# Patient Record
Sex: MALE | Race: WHITE | Employment: FULL TIME | URBAN - METROPOLITAN AREA
[De-identification: names, ages, dates, MRNs, and addresses within clinical notes are randomized per-mention and may not be internally consistent; named-entity substitution may affect disease eponyms.]

---

## 2020-12-06 ENCOUNTER — APPOINTMENT (OUTPATIENT)
Dept: GENERAL RADIOLOGY | Facility: CLINIC | Age: 40
End: 2020-12-06
Payer: MEDICARE

## 2020-12-06 ENCOUNTER — HOSPITAL ENCOUNTER (EMERGENCY)
Facility: CLINIC | Age: 40
Discharge: HOME | End: 2020-12-06
Attending: EMERGENCY MEDICINE
Payer: MEDICARE

## 2020-12-06 VITALS
TEMPERATURE: 98.4 F | OXYGEN SATURATION: 98 % | WEIGHT: 223 LBS | DIASTOLIC BLOOD PRESSURE: 96 MMHG | BODY MASS INDEX: 31.22 KG/M2 | HEIGHT: 71 IN | RESPIRATION RATE: 18 BRPM | SYSTOLIC BLOOD PRESSURE: 136 MMHG | HEART RATE: 68 BPM

## 2020-12-06 PROCEDURE — 73110 X-RAY EXAM OF WRIST: CPT

## 2020-12-06 PROCEDURE — 99283 EMERGENCY DEPT VISIT LOW MDM: CPT

## 2020-12-06 RX ORDER — KETOROLAC TROMETHAMINE 30 MG/ML
30 INJECTION, SOLUTION INTRAMUSCULAR; INTRAVENOUS ONCE
Status: DISCONTINUED | OUTPATIENT
Start: 2020-12-06 | End: 2020-12-06 | Stop reason: HOSPADM

## 2020-12-06 RX ORDER — IBUPROFEN 800 MG/1
800 TABLET ORAL EVERY 6 HOURS PRN
COMMUNITY

## 2020-12-06 RX ORDER — PREDNISONE 10 MG/1
10 TABLET ORAL SEE ADMIN INSTRUCTIONS
Qty: 21 TABLET | Refills: 0 | Status: SHIPPED | OUTPATIENT
Start: 2020-12-06 | End: 2020-12-13

## 2020-12-06 ASSESSMENT — PAIN SCALES - GENERAL
PAINLEVEL_OUTOF10: 3
PAINLEVEL_OUTOF10: 3

## 2020-12-06 NOTE — ED PROVIDER NOTES
[address] [place] [address] ED  15 [facility]  Phone: [facility] ED  EMERGENCY DEPARTMENT ENCOUNTER      Pt Name: Shade Mata  MRN: [id number]  [place] 1980  Date of evaluation: 12/6/2020  Provider: Maureen Romero DO    CHIEF COMPLAINT       Chief Complaint   Patient presents with    Wrist Pain     4 weeks.,          HISTORY OF PRESENT ILLNESS   (Location/Symptom, Timing/Onset,Context/Setting, Quality, Duration, Modifying Factors, Severity)  Note limiting factors. Shade Mata is a 41 y.o. male who presents to the emergency department for the evaluation of wrist pain. This is been going on approximately 4 weeks but it has gotten worse over the past week and a half. 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 He does not have numbness or weakness in the left hand. He takes 800 mg of ibuprofen every day and it does help with the pain comes back each day. It is a sharp pain. Nursing Notes were reviewed. REVIEW OF SYSTEMS    (2-9systems for level 4, 10 or more for level 5)     Review of Systems   Musculoskeletal:        Left wrist pain   Skin: Negative for wound. Neurological: Negative for weakness and numbness. Except asnoted above the remainder of the review of systems was reviewed and negative. PAST MEDICAL HISTORY     Past Medical History:   Diagnosis Date    Hyperlipemia          SURGICAL HISTORY       Past Surgical History:   Procedure Laterality Date    FRACTURE SURGERY           CURRENT MEDICATIONS     Previous Medications    ACYCLOVIR PO    Take by mouth    IBUPROFEN (ADVIL;MOTRIN) 800 MG TABLET    Take 800 mg by mouth every 6 hours as needed for Pain       ALLERGIES     Shellfish-derived products    FAMILY HISTORY     History reviewed. No pertinent family history.        SOCIAL HISTORY       Social History     Socioeconomic History    Marital status:      Spouse name: None    Number of children: None    Years of education: None    Highest education level: None   Occupational History    None   Social Needs    Financial resource strain: None    Food insecurity     Worry: None     Inability: None    Transportation needs     Medical: None     Non-medical: None   Tobacco Use    Smoking status: Never Smoker    Smokeless tobacco: Never Used   Substance and Sexual Activity    Alcohol use: Yes     Comment: rarely     Drug use: None    Sexual activity: None   Lifestyle    Physical activity     Days per week: None     Minutes per session: None    Stress: None   Relationships    Social connections     Talks on phone: None     Gets together: None     Attends Anabaptist service: None     Active member of club or organization: None     Attends meetings of clubs or organizations: None     Relationship status: None    Intimate partner violence     Fear of current or ex partner: None     Emotionally abused: None     Physically abused: None     Forced sexual activity: None   Other Topics Concern    None   Social History Narrative    None       SCREENINGS             PHYSICAL EXAM    (up to 7 for level 4, 8 or more for level 5)     ED Triage Vitals   BP Temp Temp src Pulse Resp SpO2 Height Weight   -- -- -- -- -- -- -- --       Physical Exam  Vitals signs and nursing note reviewed. Constitutional:       General: He is not in acute distress. Appearance: Normal appearance. He is not ill-appearing or toxic-appearing. HENT:      Head: Normocephalic and atraumatic. Eyes:      General:         Right eye: No discharge. Left eye: No discharge. Conjunctiva/sclera: Conjunctivae normal.   Neck:      Musculoskeletal: Normal range of motion. Cardiovascular:      Rate and Rhythm: Normal rate and regular rhythm. Pulses: Normal pulses. Pulmonary:      Effort: Pulmonary effort is normal. No respiratory distress. Abdominal:      General: There is no distension.    Musculoskeletal: with outpatient follow-up. Standard anticipatory guidance given to patient upon discharge. Have given them a specific time frame in which to follow-up and who to follow-up with. I have also advised them that they should return to the emergency department if they get worse, or not getting better or develop any new or concerning symptoms. Patient demonstrates understanding.    [TS]      ED Course User Index  [TS] Bar Garcia DO         PROCEDURES:  Unless otherwise noted below, none     Procedures    FINAL IMPRESSION      1. Left wrist tendinitis          DISPOSITION/PLAN   DISPOSITION Decision To Discharge 12/06/2020 05:37:57 PM      PATIENT REFERRED TO:  Costa Hsu  [address] [place] [address].   [facility] [id number]  [phone]    In 1 week        DISCHARGE MEDICATIONS:  New Prescriptions    PREDNISONE (DELTASONE) 10 MG TABLET    Take 1 tablet by mouth See Admin Instructions for 7 days Take 4 tablets by mouth daily for days 1-3   Take 3 tablets by mouth daily for days 4-5   Take 2 tablets by mouth daily day 6   Take 1 tablet by mouth on day 7          (Please note that portions of this note were completed with a voice recognition program.  Efforts were made to edit the dictations but occasionally words are mis-transcribed.)    Bar Garcia DO (electronically signed)  Board Certified Emergency Physician         Bar Garcia DO  12/06/20 1001

## 2020-12-06 NOTE — ED TRIAGE NOTES
Pt presents to ER with left wrist pain 2-4 weeks. 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

## 2021-07-09 ENCOUNTER — HOSPITAL ENCOUNTER (EMERGENCY)
Facility: CLINIC | Age: 41
Discharge: HOME | End: 2021-07-09
Attending: EMERGENCY MEDICINE
Payer: MEDICARE

## 2021-07-09 VITALS
DIASTOLIC BLOOD PRESSURE: 100 MMHG | WEIGHT: 235 LBS | RESPIRATION RATE: 10 BRPM | OXYGEN SATURATION: 96 % | HEIGHT: 71 IN | SYSTOLIC BLOOD PRESSURE: 146 MMHG | TEMPERATURE: 98.4 F | HEART RATE: 77 BPM | BODY MASS INDEX: 32.9 KG/M2

## 2021-07-09 DIAGNOSIS — R42 DIZZINESS: Primary | ICD-10-CM

## 2021-07-09 LAB
ABSOLUTE EOS #: 0.2 K/UL (ref 0–0.4)
ABSOLUTE IMMATURE GRANULOCYTE: NORMAL K/UL (ref 0–0.3)
ABSOLUTE LYMPH #: 2.7 K/UL (ref 1–4.8)
ABSOLUTE MONO #: 0.6 K/UL (ref 0.1–1.2)
ANION GAP SERPL CALCULATED.3IONS-SCNC: 13 MMOL/L (ref 9–17)
BASOPHILS # BLD: 1 % (ref 0–2)
BASOPHILS ABSOLUTE: 0.1 K/UL (ref 0–0.2)
BUN BLDV-MCNC: 12 MG/DL (ref 6–20)
BUN/CREAT BLD: NORMAL (ref 9–20)
CALCIUM SERPL-MCNC: 9.9 MG/DL (ref 8.6–10.4)
CHLORIDE BLD-SCNC: 107 MMOL/L (ref 98–107)
CO2: 21 MMOL/L (ref 20–31)
CREAT SERPL-MCNC: 0.9 MG/DL (ref 0.7–1.2)
DIFFERENTIAL TYPE: NORMAL
EOSINOPHILS RELATIVE PERCENT: 3 % (ref 1–4)
GFR AFRICAN AMERICAN: >60 ML/MIN
GFR NON-AFRICAN AMERICAN: >60 ML/MIN
GFR SERPL CREATININE-BSD FRML MDRD: NORMAL ML/MIN/{1.73_M2}
GFR SERPL CREATININE-BSD FRML MDRD: NORMAL ML/MIN/{1.73_M2}
GLUCOSE BLD-MCNC: 94 MG/DL (ref 70–99)
HCT VFR BLD CALC: 45.3 % (ref 41–53)
HEMOGLOBIN: 15.2 G/DL (ref 13.5–17.5)
IMMATURE GRANULOCYTES: NORMAL %
LYMPHOCYTES # BLD: 36 % (ref 24–44)
MCH RBC QN AUTO: 30.1 PG (ref 26–34)
MCHC RBC AUTO-ENTMCNC: 33.5 G/DL (ref 31–37)
MCV RBC AUTO: 89.9 FL (ref 80–100)
MONOCYTES # BLD: 8 % (ref 2–11)
NRBC AUTOMATED: NORMAL PER 100 WBC
PDW BLD-RTO: 13.2 % (ref 12.5–15.4)
PLATELET # BLD: 203 K/UL (ref 140–450)
PLATELET ESTIMATE: NORMAL
PMV BLD AUTO: 8.4 FL (ref 6–12)
POTASSIUM SERPL-SCNC: 4.2 MMOL/L (ref 3.7–5.3)
RBC # BLD: 5.04 M/UL (ref 4.5–5.9)
RBC # BLD: NORMAL 10*6/UL
SEG NEUTROPHILS: 52 % (ref 36–66)
SEGMENTED NEUTROPHILS ABSOLUTE COUNT: 3.9 K/UL (ref 1.8–7.7)
SODIUM BLD-SCNC: 141 MMOL/L (ref 135–144)
WBC # BLD: 7.5 K/UL (ref 3.5–11)
WBC # BLD: NORMAL 10*3/UL

## 2021-07-09 PROCEDURE — 6360000002 HC RX W HCPCS: Performed by: EMERGENCY MEDICINE

## 2021-07-09 PROCEDURE — 96375 TX/PRO/DX INJ NEW DRUG ADDON: CPT

## 2021-07-09 PROCEDURE — 96374 THER/PROPH/DIAG INJ IV PUSH: CPT

## 2021-07-09 PROCEDURE — 36415 COLL VENOUS BLD VENIPUNCTURE: CPT

## 2021-07-09 PROCEDURE — 93005 ELECTROCARDIOGRAM TRACING: CPT | Performed by: EMERGENCY MEDICINE

## 2021-07-09 PROCEDURE — 85025 COMPLETE CBC W/AUTO DIFF WBC: CPT

## 2021-07-09 PROCEDURE — 80048 BASIC METABOLIC PNL TOTAL CA: CPT

## 2021-07-09 PROCEDURE — 99281 EMR DPT VST MAYX REQ PHY/QHP: CPT

## 2021-07-09 PROCEDURE — 2580000003 HC RX 258: Performed by: EMERGENCY MEDICINE

## 2021-07-09 PROCEDURE — 96361 HYDRATE IV INFUSION ADD-ON: CPT

## 2021-07-09 RX ORDER — KETOROLAC TROMETHAMINE 30 MG/ML
30 INJECTION, SOLUTION INTRAMUSCULAR; INTRAVENOUS ONCE
Status: COMPLETED | OUTPATIENT
Start: 2021-07-09 | End: 2021-07-09

## 2021-07-09 RX ORDER — SODIUM CHLORIDE, SODIUM LACTATE, POTASSIUM CHLORIDE, AND CALCIUM CHLORIDE .6; .31; .03; .02 G/100ML; G/100ML; G/100ML; G/100ML
1000 INJECTION, SOLUTION INTRAVENOUS ONCE
Status: COMPLETED | OUTPATIENT
Start: 2021-07-09 | End: 2021-07-09

## 2021-07-09 RX ORDER — ONDANSETRON 2 MG/ML
4 INJECTION INTRAMUSCULAR; INTRAVENOUS ONCE
Status: COMPLETED | OUTPATIENT
Start: 2021-07-09 | End: 2021-07-09

## 2021-07-09 RX ADMIN — SODIUM CHLORIDE, POTASSIUM CHLORIDE, SODIUM LACTATE AND CALCIUM CHLORIDE 1000 ML: 600; 310; 30; 20 INJECTION, SOLUTION INTRAVENOUS at 13:22

## 2021-07-09 RX ADMIN — KETOROLAC TROMETHAMINE 30 MG: 30 INJECTION, SOLUTION INTRAMUSCULAR; INTRAVENOUS at 13:22

## 2021-07-09 RX ADMIN — ONDANSETRON 4 MG: 2 INJECTION INTRAMUSCULAR; INTRAVENOUS at 13:22

## 2021-07-09 ASSESSMENT — ENCOUNTER SYMPTOMS
ABDOMINAL PAIN: 0
BACK PAIN: 0
SORE THROAT: 0
NAUSEA: 1
DIARRHEA: 0
SHORTNESS OF BREATH: 0
COUGH: 0
VOMITING: 0

## 2021-07-09 ASSESSMENT — PAIN SCALES - GENERAL: PAINLEVEL_OUTOF10: 8

## 2021-07-09 ASSESSMENT — PAIN DESCRIPTION - LOCATION: LOCATION: HEAD

## 2021-07-09 NOTE — ED PROVIDER NOTES
[facility]  15 [facility]  Phone: [phone]        Pt Name: Christy Pennington  MRN: [id number]  [place] 1980  Date of evaluation: 7/9/21      CHIEF COMPLAINT     Chief Complaint   Patient presents with    Dizziness    Fatigue    Headache         HISTORY OF PRESENT ILLNESS  (Location/Symptom, Timing/Onset, Context/Setting, Quality, Duration, Modifying Factors, Severity.)    Christy Pennington is a 35 y.o. male who presents in the company of his wife, who has the same symptoms. Patient complains of dizziness, fatigue, and a headache. 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 He states that he has adequate PPE at the factory. No head injury. His headache is not sudden onset or worst of life. No numbness, tingling, or weakness. No fever or chills. No neck pain or stiffness. He does feel fatigued. He complains of photophobia. REVIEW OF SYSTEMS    (2-9 systems for level 4, 10 or more for level 5)     Review of Systems   Constitutional: Negative for chills and fever. HENT: Negative for congestion and sore throat. Respiratory: Negative for cough and shortness of breath. Cardiovascular: Negative for chest pain and palpitations. Gastrointestinal: Positive for nausea. Negative for abdominal pain, diarrhea and vomiting. Genitourinary: Negative for dysuria, frequency and urgency. Musculoskeletal: Negative for back pain and neck pain. Skin: Negative for rash and wound. Neurological: Positive for dizziness, light-headedness and headaches. Hematological: Negative for adenopathy. Does not bruise/bleed easily. PAST MEDICAL HISTORY    has a past medical history of Hyperlipemia. SURGICAL HISTORY      has a past surgical history that includes fracture surgery.     [address] Previous Medications    ACYCLOVIR PO    Take by mouth    IBUPROFEN (ADVIL;MOTRIN) 800 MG TABLET    Take 800 mg by mouth every 6 hours as needed for Pain       ALLERGIES     is allergic to shellfish-derived products. FAMILY HISTORY     has no family status information on file. family history is not on file. SOCIAL HISTORY      reports that he has never smoked. He has never used smokeless tobacco. He reports current alcohol use. PHYSICAL EXAM    (up to 7 for level 4, 8 or more for level 5)   INITIAL VITALS:  height is 5' 11\" (1.803 m) and weight is 106.6 kg (235 lb). His oral temperature is 98.4 °F (36.9 °C). His blood pressure is 146/100 (abnormal) and his pulse is 77. His respiration is 10 and oxygen saturation is 96%. Physical Exam  Vitals and nursing note reviewed. Constitutional:       Appearance: Normal appearance. He is obese. He is not ill-appearing. HENT:      Head: Normocephalic and atraumatic. Mouth/Throat:      Mouth: Mucous membranes are moist.      Pharynx: Oropharynx is clear. Eyes:      Extraocular Movements: Extraocular movements intact. Pupils: Pupils are equal, round, and reactive to light. Cardiovascular:      Rate and Rhythm: Normal rate and regular rhythm. Pulses: Normal pulses. Heart sounds: Normal heart sounds. No murmur heard. No friction rub. No gallop. Pulmonary:      Effort: Pulmonary effort is normal.      Breath sounds: Normal breath sounds. No wheezing, rhonchi or rales. Abdominal:      General: Abdomen is flat. Bowel sounds are normal.      Palpations: Abdomen is soft. Tenderness: There is no abdominal tenderness. There is no guarding or rebound. Musculoskeletal:         General: No tenderness. Normal range of motion. Cervical back: Normal range of motion and neck supple. No tenderness. Right lower leg: No edema. Left lower leg: No edema. Skin:     General: Skin is warm and dry.       Capillary Refill: Capillary refill takes less than 2 seconds. Neurological:      General: No focal deficit present. Mental Status: He is alert and oriented to person, place, and time. Mental status is at baseline. Cranial Nerves: No cranial nerve deficit. Sensory: No sensory deficit. Motor: No weakness. Coordination: Coordination normal.      Gait: Gait normal.   Psychiatric:         Mood and Affect: Mood normal.         Behavior: Behavior normal.         Thought Content: Thought content normal.         DIFFERENTIAL DIAGNOSIS/ MDM:     The patient presents with headache without signs of CNS bleed, stroke, infection, temporal arteritis, idiopathic intracranial hypertension, or other serious etiology. The patient is neurologically intact. Given the extremely low risk of these diagnoses further testing and evaluation for these possibilities does not appear to be indicated at this time. The patient appears stable for discharge and has been instructed to return immediately if the symptoms worsen in any way, or in 8-12 hr if not improved for re-evaluation. We also discussed returning to the Emergency Department immediately if new or worsening symptoms occur. We have discussed the symptoms which are most concerning (e.g., changing or worsening pain, visual or hearing changes, numbness or weakness, fever, stiff neck, or rash) that necessitate immediate return. The patient understands that at this time there is no evidence for a more malignant underlying process, but the patient also understands that early in the process of an illness or injury, an emergency department workup can be falsely reassuring. Routine discharge counseling was given, and the patient understands that worsening, changing or persistent symptoms should prompt an immediate call or follow up with their primary physician or return to the emergency department. The importance of appropriate follow up was also discussed.   I have reviewed the disposition diagnosis with the patient and or their family/guardian. I have answered their questions and given discharge instructions. They voiced understanding of these instructions and did not have any further questions or complaints.                 DIAGNOSTIC RESULTS     EKG: All EKG's are interpreted by the Emergency Department Physician who either signs or Co-signs this chart in the absence of a cardiologist.    EKG Interpretation    Interpreted by me    Rhythm: normal sinus   Rate: normal  Axis: normal  Ectopy: none  Conduction: normal  ST Segments: no acute change  T Waves: no acute change  Q Waves: none    Clinical Impression: no acute changes and normal EKG    RADIOLOGY:        Interpretation per the Radiologist below, if available at the time of this note:    none    LABS:  Results for orders placed or performed during the hospital encounter of 65/97/33   Basic Metabolic Panel   Result Value Ref Range    Glucose 94 70 - 99 mg/dL    BUN 12 6 - 20 mg/dL    CREATININE 0.90 0.70 - 1.20 mg/dL    Bun/Cre Ratio NOT REPORTED 9 - 20    Calcium 9.9 8.6 - 10.4 mg/dL    Sodium 141 135 - 144 mmol/L    Potassium 4.2 3.7 - 5.3 mmol/L    Chloride 107 98 - 107 mmol/L    CO2 21 20 - 31 mmol/L    Anion Gap 13 9 - 17 mmol/L    GFR Non-African American >60 >60 mL/min    GFR African American >60 >60 mL/min    GFR Comment          GFR Staging NOT REPORTED    CBC Auto Differential   Result Value Ref Range    WBC 7.5 3.5 - 11.0 k/uL    RBC 5.04 4.5 - 5.9 m/uL    Hemoglobin 15.2 13.5 - 17.5 g/dL    Hematocrit 45.3 41 - 53 %    MCV 89.9 80 - 100 fL    MCH 30.1 26 - 34 pg    MCHC 33.5 31 - 37 g/dL    RDW 13.2 12.5 - 15.4 %    Platelets 294 851 - 228 k/uL    MPV 8.4 6.0 - 12.0 fL    NRBC Automated NOT REPORTED per 100 WBC    Differential Type NOT REPORTED     Seg Neutrophils 52 36 - 66 %    Lymphocytes 36 24 - 44 %    Monocytes 8 2 - 11 %    Eosinophils % 3 1 - 4 %    Basophils 1 0 - 2 %    Immature Granulocytes NOT REPORTED 0 % Segs Absolute 3.90 1.8 - 7.7 k/uL    Absolute Lymph # 2.70 1.0 - 4.8 k/uL    Absolute Mono # 0.60 0.1 - 1.2 k/uL    Absolute Eos # 0.20 0.0 - 0.4 k/uL    Basophils Absolute 0.10 0.0 - 0.2 k/uL    Absolute Immature Granulocyte NOT REPORTED 0.00 - 0.30 k/uL    WBC Morphology NOT REPORTED     RBC Morphology NOT REPORTED     Platelet Estimate NOT REPORTED        No significant laboratory abnormalities    EMERGENCY DEPARTMENT COURSE:   Vitals:    Vitals:    07/09/21 1205   BP: (!) 146/100   Pulse: 77   Resp: 10   Temp: 98.4 °F (36.9 °C)   TempSrc: Oral   SpO2: 96%   Weight: 106.6 kg (235 lb)   Height: 5' 11\" (1.803 m)     -------------------------  BP: (!) 146/100, Temp: 98.4 °F (36.9 °C), Pulse: 77, Resp: 10      RE-EVALUATION:  2:05 PM EDT  Patient appears much more comfortable. He has received a liter of IV fluids. Updated on available results. Patient will be discharged home. Follow-up with the primary care doctor. CONSULTS:  none    PROCEDURES:  None    FINAL IMPRESSION      1. Dizziness          DISPOSITION/PLAN   DISPOSITION Decision To Discharge 07/09/2021 02:02:06 PM      CONDITION ON DISPOSITION:   Stable     PATIENT REFERRED TO:  Naomi Olivera  [address]   [place] [address]  [phone]    Schedule an appointment as soon as possible for a visit in 2 days        DISCHARGE MEDICATIONS:  New Prescriptions    No medications on file       (Please note that portions of this note were completed with a voicerecognition program.  Efforts were made to edit the dictations but occasionally words are mis-transcribed.)    Marciano Carias MD  Attending Emergency Medicine Physician       Marciano Carias MD  07/09/21 7953

## 2021-07-10 LAB
EKG ATRIAL RATE: 63 BPM
EKG P AXIS: 28 DEGREES
EKG P-R INTERVAL: 118 MS
EKG Q-T INTERVAL: 426 MS
EKG QRS DURATION: 94 MS
EKG QTC CALCULATION (BAZETT): 435 MS
EKG R AXIS: 65 DEGREES
EKG T AXIS: 52 DEGREES
EKG VENTRICULAR RATE: 63 BPM

## 2022-01-12 ENCOUNTER — HOSPITAL ENCOUNTER (EMERGENCY)
Facility: CLINIC | Age: 42
Discharge: HOME | End: 2022-01-12
Attending: EMERGENCY MEDICINE
Payer: MEDICARE

## 2022-01-12 VITALS
BODY MASS INDEX: 32.9 KG/M2 | SYSTOLIC BLOOD PRESSURE: 156 MMHG | DIASTOLIC BLOOD PRESSURE: 96 MMHG | OXYGEN SATURATION: 96 % | WEIGHT: 235 LBS | TEMPERATURE: 99.3 F | RESPIRATION RATE: 16 BRPM | HEART RATE: 101 BPM | HEIGHT: 71 IN

## 2022-01-12 DIAGNOSIS — J01.80 ACUTE NON-RECURRENT SINUSITIS OF OTHER SINUS: Primary | ICD-10-CM

## 2022-01-12 PROCEDURE — 99284 EMERGENCY DEPT VISIT MOD MDM: CPT

## 2022-01-12 RX ORDER — AZITHROMYCIN 250 MG/1
TABLET, FILM COATED ORAL
Qty: 1 PACKET | Refills: 0 | Status: SHIPPED | OUTPATIENT
Start: 2022-01-12 | End: 2022-05-30

## 2022-01-12 RX ORDER — PREDNISONE 50 MG/1
50 TABLET ORAL DAILY
Qty: 4 TABLET | Refills: 0 | Status: SHIPPED | OUTPATIENT
Start: 2022-01-12 | End: 2022-01-16

## 2022-01-12 RX ORDER — PREDNISONE 50 MG/1
50 TABLET ORAL DAILY
Qty: 4 TABLET | Refills: 0 | Status: SHIPPED | OUTPATIENT
Start: 2022-01-12 | End: 2022-01-12 | Stop reason: SDUPTHER

## 2022-01-12 RX ORDER — AZITHROMYCIN 250 MG/1
TABLET, FILM COATED ORAL
Qty: 1 PACKET | Refills: 0 | Status: SHIPPED | OUTPATIENT
Start: 2022-01-12 | End: 2022-01-12 | Stop reason: SDUPTHER

## 2022-01-12 ASSESSMENT — ENCOUNTER SYMPTOMS
DIARRHEA: 0
SORE THROAT: 0
SINUS PAIN: 1
VOMITING: 0
SHORTNESS OF BREATH: 0

## 2022-01-12 NOTE — ED PROVIDER NOTES
[facility]  [facility]  Phone: [facility] ED  EMERGENCY DEPARTMENT ENCOUNTER      Pt Name: Trent Alejandro  MRN: [id number]  [place] 1980  Date of evaluation: 1/12/2022  Provider: Mary Kay Greenwood DO    CHIEF COMPLAINT       Chief Complaint   Patient presents with    Cough    Pharyngitis    Generalized Body Aches    Sinusitis         HISTORY OF PRESENT ILLNESS   (Location/Symptom, Timing/Onset,Context/Setting, Quality, Duration, Modifying Factors, Severity)  Note limiting factors. Trent Alejandro is a 35 y.o. male who presents to the emergency department for the evaluation of cough, sinus congestion, sore throat as well as body aches. Patient states has been going on for the last few days. Thinks he had a low-grade fever at home, has not taken any Motrin or Tylenol for about 12 hours. He has no abdominal pain or vomiting or diarrhea. He does have a mild sore throat as stated but no difficulty breathing or swallowing. He has history of sinusitis and he says this feels similar    Nursing Notes were reviewed. REVIEW OF SYSTEMS    (2-9systems for level 4, 10 or more for level 5)     Review of Systems   Constitutional: Negative for fever. HENT: Positive for sinus pain. Negative for sore throat. Respiratory: Negative for shortness of breath. Cardiovascular: Negative for chest pain. Gastrointestinal: Negative for diarrhea and vomiting. Genitourinary: Negative for dysuria. Skin: Negative for rash. Neurological: Negative for weakness. All other systems reviewed and are negative. Except asnoted above the remainder of the review of systems was reviewed and negative.        PAST MEDICAL HISTORY     Past Medical History:   Diagnosis Date    Hyperlipemia          SURGICAL HISTORY       Past Surgical History:   Procedure Laterality Date    FRACTURE SURGERY           CURRENT MEDICATIONS     Previous Medications    ACYCLOVIR Year: Not on file    Unstable Housing in the Last Year: Not on file       SCREENINGS             PHYSICAL EXAM    (up to 7 for level 4, 8 or more for level 5)     ED Triage Vitals [01/12/22 1649]   BP Temp Temp Source Pulse Resp SpO2 Height Weight   (!) 156/96 99.3 °F (37.4 °C) Oral 101 16 96 % 5' 11\" (1.803 m) 235 lb (106.6 kg)       Physical Exam  Vitals and nursing note reviewed. Constitutional:       General: He is not in acute distress. Appearance: Normal appearance. He is not ill-appearing or toxic-appearing. HENT:      Head: Normocephalic and atraumatic. Nose: Nose normal. No congestion. Mouth/Throat:      Mouth: Mucous membranes are moist.      Pharynx: Posterior oropharyngeal erythema present. No oropharyngeal exudate. Tonsils: No tonsillar exudate. 0 on the right. 0 on the left. Eyes:      General:         Right eye: No discharge. Left eye: No discharge. Conjunctiva/sclera: Conjunctivae normal.   Cardiovascular:      Rate and Rhythm: Normal rate and regular rhythm. Pulses: Normal pulses. Heart sounds: Normal heart sounds. No murmur heard. Pulmonary:      Effort: Pulmonary effort is normal. No respiratory distress. Breath sounds: Normal breath sounds. No wheezing. Abdominal:      General: Abdomen is flat. There is no distension. Palpations: Abdomen is soft. Tenderness: There is no abdominal tenderness. Musculoskeletal:         General: No deformity or signs of injury. Normal range of motion. Cervical back: Normal range of motion. Skin:     General: Skin is warm and dry. Capillary Refill: Capillary refill takes less than 2 seconds. Findings: No rash. Neurological:      General: No focal deficit present. Mental Status: He is alert. Mental status is at baseline. Motor: No weakness. Comments: Speaking normally. No facial asymmetry. Moving all 4 extremities. Normal gait.     Psychiatric:         Mood and Affect: Mood normal.         EMERGENCY DEPARTMENT COURSE and DIFFERENTIAL DIAGNOSIS/MDM:   Vitals:    Vitals:    01/12/22 1649   BP: (!) 156/96   Pulse: 101   Resp: 16   Temp: 99.3 °F (37.4 °C)   TempSrc: Oral   SpO2: 96%   Weight: 106.6 kg (235 lb)   Height: 5' 11\" (1.803 m)       Patient presents to the emergency department with a complaint described above. Vital signs show slight hypertension, otherwise unremarkable. Physical exam reveals no obvious abnormalities or deficits. At this time, I do suspect a viral infection versus possible sinusitis and I will treat accordingly. Have talked about follow-up and what to return to ER for    At this time the patient is without objective evidence of an acute process requiring hospitalization or inpatient management. They have remained hemodynamically stable and are stable for discharge with outpatient follow-up. Standard anticipatory guidance given to patient upon discharge. Have given them a specific time frame in which to follow-up and who to follow-up with. I have also advised them that they should return to the emergency department if they get worse, or not getting better or develop any new or concerning symptoms. Patient demonstrates understanding. PROCEDURES:  Unless otherwise noted below, none     Procedures    FINAL IMPRESSION      1. Acute non-recurrent sinusitis of other sinus          DISPOSITION/PLAN   DISPOSITION Decision To Discharge 01/12/2022 04:51:54 PM      PATIENT REFERRED TO:  Kamari Montenegro.   [facility] [address]  [phone]    In 1 week        DISCHARGE MEDICATIONS:  New Prescriptions    AZITHROMYCIN (ZITHROMAX Z-KHARI) 250 MG TABLET    Follow directions on package labelling    PREDNISONE (DELTASONE) 50 MG TABLET    Take 1 tablet by mouth daily for 4 days          (Please note that portions of this note were completed with a voice recognition program.  Efforts were made to edit the dictations but occasionally words are mis-transcribed.)    Charo Ring DO (electronically signed)  Board Certified Emergency Physician         Charo Ring DO  01/12/22 2051

## 2022-01-12 NOTE — ED NOTES
Patient to ED via self ambulatory to room 12  Patient here for complaint of cough, generalized body aches, fever, sore throat, and sinus pain  Patient states this has been going on for the last few days  Denies receiving the covid vaccine  Denies CP, SOB, or N/V    Vitals obtained and call light provided  Patient resting comfortably on stretcher in no apparent distress  Respirations even and non-labored  Dr. Carolina Kimball at bedside to evaluate patient     Russ Paredes RN  01/12/22 0174

## 2022-01-12 NOTE — Clinical Note
Rachael Stephens was seen and treated in our emergency department on 1/12/2022. He may return to work on 01/14/2022. If you have any questions or concerns, please don't hesitate to call.       Frandy Cosme, DO

## 2022-05-30 ENCOUNTER — HOSPITAL ENCOUNTER (EMERGENCY)
Facility: CLINIC | Age: 42
Discharge: HOME | End: 2022-05-30
Attending: EMERGENCY MEDICINE
Payer: MEDICARE

## 2022-05-30 ENCOUNTER — APPOINTMENT (OUTPATIENT)
Dept: GENERAL RADIOLOGY | Facility: CLINIC | Age: 42
End: 2022-05-30
Payer: MEDICARE

## 2022-05-30 VITALS
DIASTOLIC BLOOD PRESSURE: 107 MMHG | BODY MASS INDEX: 32.2 KG/M2 | RESPIRATION RATE: 16 BRPM | OXYGEN SATURATION: 98 % | SYSTOLIC BLOOD PRESSURE: 145 MMHG | TEMPERATURE: 98 F | HEART RATE: 68 BPM | WEIGHT: 230 LBS | HEIGHT: 71 IN

## 2022-05-30 DIAGNOSIS — S61.209A EXTENSOR TENDON LACERATION OF FINGER WITH OPEN WOUND, INITIAL ENCOUNTER: Primary | ICD-10-CM

## 2022-05-30 DIAGNOSIS — S56.429A EXTENSOR TENDON LACERATION OF FINGER WITH OPEN WOUND, INITIAL ENCOUNTER: Primary | ICD-10-CM

## 2022-05-30 PROCEDURE — 99283 EMERGENCY DEPT VISIT LOW MDM: CPT

## 2022-05-30 PROCEDURE — 73130 X-RAY EXAM OF HAND: CPT

## 2022-05-30 PROCEDURE — 2500000003 HC RX 250 WO HCPCS: Performed by: EMERGENCY MEDICINE

## 2022-05-30 RX ORDER — CEPHALEXIN 500 MG/1
500 CAPSULE ORAL 3 TIMES DAILY
Qty: 21 CAPSULE | Refills: 0 | Status: SHIPPED | OUTPATIENT
Start: 2022-05-30 | End: 2022-06-06

## 2022-05-30 RX ORDER — LIDOCAINE HYDROCHLORIDE 10 MG/ML
20 INJECTION, SOLUTION INFILTRATION; PERINEURAL ONCE
Status: COMPLETED | OUTPATIENT
Start: 2022-05-30 | End: 2022-05-30

## 2022-05-30 RX ADMIN — LIDOCAINE HYDROCHLORIDE 20 ML: 10 INJECTION, SOLUTION INFILTRATION; PERINEURAL at 07:37

## 2022-05-30 ASSESSMENT — PAIN SCALES - GENERAL: PAINLEVEL_OUTOF10: 4

## 2022-05-30 ASSESSMENT — PAIN - FUNCTIONAL ASSESSMENT: PAIN_FUNCTIONAL_ASSESSMENT: NONE - DENIES PAIN

## 2022-05-30 NOTE — ED PROVIDER NOTES
[address]  EMERGENCY DEPARTMENT ENCOUNTER      Pt Name: Maykel Myers  MRN: [id number]  [place] 1980  Date of evaluation: 5/30/2022  Provider: Peggy La MD    CHIEF COMPLAINT     Chief Complaint   Patient presents with    Finger Injury     right pinky, cut with glass 30 minutes ago         HISTORY OF PRESENT ILLNESS   (Location/Symptom, Timing/Onset, Context/Setting,Quality, Duration, Modifying Factors, Severity)  Note limiting factors. Maykel Myers is a 38 y.o. male who presents to the emergency department with a chief complaint of laceration to the right little finger. Patient was assembling an outdoor grill in his house and the glass window fell on his hand where he sustained a laceration to the right little finger. He denies distal numbness. No other injuries reported. He is up-to-date with his tetanus immune status. The history is provided by the patient. Nursing Notes werereviewed. REVIEW OF SYSTEMS    (2-9 systems for level 4, 10 or more for level 5)     Review of Systems   All other systems reviewed and are negative. Except as noted above the remainder of the review of systems was reviewed and negative. PAST MEDICAL HISTORY     Past Medical History:   Diagnosis Date    Hyperlipemia          SURGICALHISTORY       Past Surgical History:   Procedure Laterality Date    FRACTURE SURGERY           CURRENT MEDICATIONS       Discharge Medication List as of 5/30/2022  8:46 AM      CONTINUE these medications which have NOT CHANGED    Details   ibuprofen (ADVIL;MOTRIN) 800 MG tablet Take 800 mg by mouth every 6 hours as needed for PainHistorical Med      ACYCLOVIR PO Take by mouthHistorical Med             ALLERGIES     Shellfish-derived products    FAMILY HISTORY     History reviewed. No pertinent family history.        SOCIAL HISTORY       Social History     Socioeconomic History    Marital status:      Spouse name: None    Number of children: None    Years of education: None    Highest education level: None   Occupational History    None   Tobacco Use    Smoking status: Never Smoker    Smokeless tobacco: Never Used   Substance and Sexual Activity    Alcohol use: Yes     Comment: rarely     Drug use: Yes     Comment: cbc    Sexual activity: None   Other Topics Concern    None   Social History Narrative    None     Social Determinants of Health     Financial Resource Strain:     Difficulty of Paying Living Expenses: Not on file   Food Insecurity:     Worried About Running Out of Food in the Last Year: Not on file    Arelis of Food in the Last Year: Not on file   Transportation Needs:     Lack of Transportation (Medical): Not on file    Lack of Transportation (Non-Medical):  Not on file   Physical Activity:     Days of Exercise per Week: Not on file    Minutes of Exercise per Session: Not on file   Stress:     Feeling of Stress : Not on file   Social Connections:     Frequency of Communication with Friends and Family: Not on file    Frequency of Social Gatherings with Friends and Family: Not on file    Attends Hindu Services: Not on file    Active Member of [address] [place] [address] or Organizations: Not on file    Attends Club or Organization Meetings: Not on file    Marital Status: Not on file   Intimate Partner Violence:     Fear of Current or Ex-Partner: Not on file    Emotionally Abused: Not on file    Physically Abused: Not on file    Sexually Abused: Not on file   Housing Stability:     Unable to Pay for Housing in the Last Year: Not on file    Number of [place] in the Last Year: Not on file    Unstable Housing in the Last Year: Not on file       SCREENINGS    Cristian Coma Scale  Eye Opening: Spontaneous  Best Verbal Response: Oriented  Best Motor Response: Obeys commands  Cristian Coma Scale Score: 15        PHYSICAL EXAM    (up to 7 for level 4, 8 or more for level 5)     ED Triage Vitals [05/30/22 0716]   BP Temp Temp Source Heart Rate Resp SpO2 Height Weight   (!) 145/107 98 °F (36.7 °C) Oral 68 16 98 % 5' 11\" (1.803 m) 230 lb (104.3 kg)       Physical Exam  Vitals reviewed. Constitutional:       General: He is not in acute distress. Appearance: He is not ill-appearing. Skin:     General: Skin is warm and dry. Coloration: Skin is not pale. Comments: Patient presents with a well opposed semicircular laceration overlying the dorsum of the PIP joint of the right little finger with the flap of the laceration based radially. He can extend the finger against resistance and has intact neurovascular function distally. Neurological:      Mental Status: He is alert. DIAGNOSTIC RESULTS     EKG: All EKG's are interpreted by the Emergency Department Physician who either signs orCo-signs this chart in the absence of a cardiologist.    RADIOLOGY:     Interpretation per the Radiologist below, ifavailable at the time of this note:    XR HAND RIGHT (MIN 3 VIEWS)   Final Result   Soft tissue swelling 5th finger without evidence of acute osseous abnormality   or radiopaque foreign body. ED BEDSIDE ULTRASOUND:   Performed by ED Physician - none    LABS:  Labs Reviewed - No data to display    All other labs were within normal range ornot returned as of this dictation. EMERGENCY DEPARTMENT COURSE and DIFFERENTIAL DIAGNOSIS/MDM:   Vitals:    Vitals:    05/30/22 0716   BP: (!) 145/107   Pulse: 68   Resp: 16   Temp: 98 °F (36.7 °C)   TempSrc: Oral   SpO2: 98%   Weight: 104.3 kg (230 lb)   Height: 5' 11\" (1.803 m)            X-rays do not reveal bony fracture or foreign body. The wound was cleaned with Hibiclens. A proximal a vascular tourniquet was applied and local infiltration around the skin edges was carried out with lidocaine 1% plain. The total length of the laceration is approximately 1.5 cm. The flap was then everted and the underlying wound examined. There is partial laceration of the extensor tendon mechanism. I am uncertain if the capsule of the joint has been violated. It was copiously irrigated with saline. Closure was carried out with a combination of simple and horizontal mattress sutures using 4-0 Prolene. An extensor splint was applied and patient is referred to outpatient hand specialist follow-up for possible exploration. He is placed on Keflex for wound prophylaxis. MDM    CONSULTS:  None    PROCEDURES:  Unlessotherwise noted below, none     Procedures    FINAL IMPRESSION      1. Extensor tendon laceration of finger with open wound, initial encounter          DISPOSITION/PLAN   DISPOSITION Decision To Discharge 05/30/2022 08:39:30 AM      PATIENT REFERRED TO:  Sharona Rivers  [address] [place] [address] [facility], [address] [place], [place] [address] [address] [address]  [address], [place]  [address] [place] [phone]            DISCHARGE MEDICATIONS:         Problem List:  There is no problem list on file for this patient. Summation      Patient Course: Discharged    ED Medicationsadministered this visit:    Medications   lidocaine 1 % injection 20 mL (20 mLs IntraDERmal Given 5/30/22 0737)       New Prescriptions from this visit:    Discharge Medication List as of 5/30/2022  8:46 AM      START taking these medications    Details   cephALEXin (KEFLEX) 500 MG capsule Take 1 capsule by mouth 3 times daily for 7 days, Disp-21 capsule, R-0Normal             Follow-up:  Sharona Rivers  [address] [place] [address], [address] [facility] [address] [address]  [address], [place]  [address]  [phone]              Final Impression:   1.  Extensor tendon laceration of finger with open wound, initial encounter               (Please note that portions of this note were completed with a voice recognitionprogram.  Efforts were made to edit the dictations but occasionally words are mis-transcribed.)    Fallon Bonilla MD (electronically signed)  Attending Emergency Physician            Fallon Bonilla MD  05/30/22 2437

## 2022-05-30 NOTE — ED NOTES
Pt presented to the ED c/o right pinky laceration. Report he was putting a grill together when he cut himself with the glass from the grill. Report he had protective gloves on at the time. Reports he rinsed his hand out with water. Last tetanus shot was three years ago. Laceration is well approximated, bleeding controlled. Denies pain at this time.       Dyan Gerber RN  05/30/22 5298

## 2022-06-15 ENCOUNTER — HOSPITAL ENCOUNTER (EMERGENCY)
Facility: CLINIC | Age: 42
Discharge: HOME | End: 2022-06-15
Attending: EMERGENCY MEDICINE
Payer: MEDICARE

## 2022-06-15 VITALS
DIASTOLIC BLOOD PRESSURE: 92 MMHG | TEMPERATURE: 98.2 F | RESPIRATION RATE: 16 BRPM | SYSTOLIC BLOOD PRESSURE: 145 MMHG | OXYGEN SATURATION: 97 % | HEART RATE: 77 BPM

## 2022-06-15 DIAGNOSIS — Z48.02 ENCOUNTER FOR REMOVAL OF SUTURES: Primary | ICD-10-CM

## 2022-06-15 PROCEDURE — 99282 EMERGENCY DEPT VISIT SF MDM: CPT

## 2022-06-15 ASSESSMENT — PAIN - FUNCTIONAL ASSESSMENT
PAIN_FUNCTIONAL_ASSESSMENT: NONE - DENIES PAIN
PAIN_FUNCTIONAL_ASSESSMENT: NONE - DENIES PAIN

## 2022-06-15 NOTE — ED PROVIDER NOTES
[facility]  [facility]  Phone: [address]      Pt Name: Kiran Orozco  MRN: [id number]  [place] 1980  Date of evaluation: 6/15/22      CHIEF COMPLAINT:  Chief Complaint   Patient presents with    Wound Check       HISTORY OF PRESENT ILLNESS    Kiran Orozco is a 38 y.o. male who presents to the emergency room for suture removal.  On May 30 he was seen and evaluated for a laceration to his right little finger. He had sutures placed and there was concern for partial tendon laceration. Patient was instructed to follow-up with Dr. Taco Pineda for further recommendations and was placed in a splint at that time. Patient states that he remove the splint by himself because he was unable to complete his duties at work and has not followed up with Dr. Taco Pineda. Patient states that he did take the Keflex as prescribed until completely gone. He denies any complaints or signs and symptoms of infection to include drainage, swelling, redness, fever, chills, nausea, vomiting, paresthesias, numbness or decreased range of motion. Denies any complaints of pain. He is right-hand dominant. Nursing Notes were reviewed. REVIEW OF SYSTEMS       Constitutional: Denies recent fever, chills. Eyes: No visual changes. Neck: No neck pain. Respiratory: Denies recent shortness of breath. Cardiac:  Denies recent chest pain. GI:  No nausea. No vomiting. Denies abdominal pain/diarrhea. Musculoskeletal: Denies focal weakness. Neurologic:  Denies headache or focal weakness. Skin:   Suture removal to right fifth digit    Negative in 10 essential Systems except as mentioned above and in the HPI. PAST MEDICAL HISTORY   PMH:  has a past medical history of Hyperlipemia. Surgical History:  has a past surgical history that includes fracture surgery. Social History:  reports that he has never smoked.  He has never used smokeless tobacco. He reports current alcohol use. He reports current drug use. Family History: None  Psychiatric History: None    Allergies:is allergic to shellfish-derived products. PHYSICAL EXAM     INITIAL VITALS: BP (!) 145/92   Pulse 77   Temp 98.2 °F (36.8 °C) (Oral)   Resp 16   SpO2 97%     Constitutional:  Well developed   Eyes:  Pupils equal   HENT:  Atraumatic, external ears normal, nose normal  Respiratory:  Comfortable speech and breathing. Musculoskeletal:  No edema, no tenderness, no deformities. Per Integ exam.   Integument:   Healed laceration intact over PIP joint of right fifth digit without erythema, swelling or drainage. Neurologic:  Alert & oriented x 3, no focal deficits noted       DIAGNOSTIC RESULTS     EKG: All EKG's are interpreted by the Emergency Department Physician who either signs or Co-signs this chart in the absence of a cardiologist.  Not indicated    RADIOLOGY:   Reviewed the radiologist:  No orders to display     Not indicated      LABS:  Labs Reviewed - No data to display  Not indicated    EMERGENCY DEPARTMENT COURSE:     Patient presents emergency room today with complaints as described above. Vital signs are within normal limits. Patient reports that he did not follow-up with Dr. Monalisa Rosenthal as previously recommended for partial tendon tear. Patient states that he did remove the splint a few days after the injury occurred. I did strongly recommend follow-up with Dr. Monalisa Rosenthal and suggested reapplication of splint to right fifth digit and explained the concern for partial tendon tear turning into a complete tendon tear with continued use of the right fifth digit. I recommended application of splint while in the emergency department and patient declined and states that he has the previous splint in his car and will apply and follow-up with Dr. Monalisa Rosenthal as previously recommended.     The patient was advised to return to the Emergency Department for increasing pain, numbness, weakness, decreased ROM or coldness to the finger. The patient was further instructed to follow up today with their orthopedics. The patient voiced understanding of these instructions. The patient understands that at this time there is no evidence for a more malignant underlying process, but the patient also understands that early in the process of an illness or injury, an emergency department workup can be falsely reassuring. Routine discharge counseling was given, and the patient understands that worsening, changing or persistent symptoms should prompt an immediate call or follow up with their primary physician or return to the emergency department. The importance of appropriate follow up was also discussed. I have reviewed the disposition diagnosis with the patient and or their family/guardian. I have answered their questions and given discharge instructions. They voiced understanding of these instructions and did not have any further questions or complaints. No orders of the defined types were placed in this encounter. CONSULTS:  None      Suture/ Staple Removal Procedure Note  Indication:  wound healed, ready for removal    Procedure: The patient was placed in the appropriate position and 1 interuppted and mattress sutures were removed without difficulty. Other items: the wound appears well healed    The patient tolerated the procedure well. Complications: None      FINAL IMPRESSION      1. Encounter for removal of sutures          DISPOSITION/PLAN:  DISPOSITION Decision To Discharge 06/15/2022 11:23:36 AM        PATIENT REFERRED TO:  Henry Calzada  [address] [place] [address].   [address]  [phone]    Call in 2 days      Rebecca [place], [place] [address] [address] [phone]    Call today      [facility] ED  [address]  [phone]    As needed      DISCHARGE MEDICATIONS:  Discharge Medication List as of 6/15/2022 11:37 AM          (Please note that portions of this note were completed with a voice recognition program.  Efforts were made to edit the dictations but occasionally words are mis-transcribed.)    Caprice Chow, [address] EDMUNDO  06/15/22 1143

## 2022-06-15 NOTE — ED PROVIDER NOTES
[facility] ED  [address] [facility]  Phone: [phone]      Attending Physician Attestation    I performed a history and physical examination of the patient and discussed management with the mid level provider. I reviewed the mid level provider's note and agree with the documented findings and plan of care. Any areas of disagreement are noted on the chart. I was personally present for the key portions of any procedures. I have documented in the chart those procedures where I was not present during the key portions. I have reviewed the emergency nurses triage note. I agree with the chief complaint, past medical history, past surgical history, allergies, medications, social and family history as documented unless otherwise noted below. Documentation of the HPI, Physical Exam and Medical Decision Making performed by mid level providers is based on my personal performance of the HPI, PE and MDM. For Physician Assistant/ Nurse Practitioner cases/documentation I have personally evaluated this patient and have completed at least one if not all key elements of the E/M (history, physical exam, and MDM). Additional findings are as noted. CHIEF COMPLAINT       Chief Complaint   Patient presents with    Wound Check         HISTORY OF PRESENT ILLNESS    Willie Padilla is a 38 y.o. male who presents for suture removal from laceration repair right fifth finger. Patient was seen in the emergency department 5/30/2022 with a laceration to the right fifth finger. He was assembling outdoor grill at his home and the glass window fell onto his hand. The wound was explored he had a partial tear to the extensor tendon. Sutures were placed and he was referred to the hand surgeon for follow-up. Patient did not follow-up with a hand surgeon as he said he was not able to get to see the hand surgeon with his work schedule. He was placed in an extensor splint.   Patient has remove the splint because he said he could not work with the splint on. He states she has good movement to the finger and did not see a need to follow-up with a hand surgeon. He is presenting now for sutures to be removed. PAST MEDICAL HISTORY    has a past medical history of Hyperlipemia. SURGICAL HISTORY      has a past surgical history that includes fracture surgery. CURRENT MEDICATIONS       Previous Medications    ACYCLOVIR PO    Take by mouth    IBUPROFEN (ADVIL;MOTRIN) 800 MG TABLET    Take 800 mg by mouth every 6 hours as needed for Pain       ALLERGIES     is allergic to shellfish-derived products. FAMILY HISTORY     has no family status information on file. family history is not on file. SOCIAL HISTORY      reports that he has never smoked. He has never used smokeless tobacco. He reports current alcohol use. He reports current drug use. PHYSICAL EXAM     INITIAL VITALS:  oral temperature is 98.2 °F (36.8 °C). His blood pressure is 145/92 (abnormal) and his pulse is 77. His respiration is 16 and oxygen saturation is 97%. Patient was seen by midlevel provider Gabe Coppola CNP. Did not do a physical exam on this patient. Case was presented to me including history physical and treatment plan. I was in agreement with the plan of care. Was available for consultation while patient was here in the emergency department. DIAGNOSTIC RESULTS     EKG: All EKG's are interpreted by the Emergency Department Physician who either signs or Co-signs this chart in the absence of a cardiologist.        RADIOLOGY:   Non-plain film images such as CT, Ultrasound and MRI are read by the radiologist. Plain radiographic images are visualized and the radiologist interpretations are reviewed as follows:         LABS:  No results found for this visit on 06/15/22.         EMERGENCY DEPARTMENT COURSE:   Vitals:    Vitals:    06/15/22 1123   BP: (!) 145/92   Pulse: 77   Resp: 16   Temp: 98.2 °F (36.8 °C)   TempSrc: Oral SpO2: 97%     -------------------------  BP: (!) 145/92, Temp: 98.2 °F (36.8 °C), Heart Rate: 77, Resp: 16      PERTINENT ATTENDING PHYSICIAN COMMENTS:  There is explained to the patient that with the partial tear of the tendon movement could turn it into a complete tear where he would lose function of that finger. It was recommended that he be placed back in the splint and follow-up with a hand surgeon. He stated if a splint was placed he would remove it because he cannot work with it. He will take the recommendations for him to see a hand surgeon and he may or may not call the hand surgeon. Does understand there is risk of other injury to the extensor tendon of the right fifth finger. Could result in him losing the ability to extend that finger. Wound is healing well and sutures were removed. (Please note that portions of this note were completed with a voice recognition program.  Efforts were made to edit the dictations but occasionally words are mis-transcribed. )    Samira Byrne DO, , MD, F.A.C.E.P.   Attending Emergency Medicine Physician        Lestine Cushing, DO  06/15/22 4455

## 2022-06-15 NOTE — ED TRIAGE NOTES
Here for suture removal right 5th digit. Wound appears clean dry and intact, no redness or drainage. Good PMS.

## 2022-07-08 ENCOUNTER — HOSPITAL ENCOUNTER (EMERGENCY)
Facility: CLINIC | Age: 42
Discharge: HOME | End: 2022-07-08
Attending: EMERGENCY MEDICINE
Payer: MEDICARE

## 2022-07-08 ENCOUNTER — APPOINTMENT (OUTPATIENT)
Dept: GENERAL RADIOLOGY | Facility: CLINIC | Age: 42
End: 2022-07-08
Payer: MEDICARE

## 2022-07-08 VITALS
RESPIRATION RATE: 16 BRPM | DIASTOLIC BLOOD PRESSURE: 93 MMHG | HEART RATE: 86 BPM | BODY MASS INDEX: 32.9 KG/M2 | TEMPERATURE: 98.9 F | WEIGHT: 235 LBS | SYSTOLIC BLOOD PRESSURE: 144 MMHG | HEIGHT: 71 IN | OXYGEN SATURATION: 96 %

## 2022-07-08 DIAGNOSIS — B34.9 VIRAL SYNDROME: Primary | ICD-10-CM

## 2022-07-08 LAB
SARS-COV-2, RAPID: NOT DETECTED
SPECIMEN DESCRIPTION: NORMAL

## 2022-07-08 PROCEDURE — 99284 EMERGENCY DEPT VISIT MOD MDM: CPT

## 2022-07-08 PROCEDURE — 71046 X-RAY EXAM CHEST 2 VIEWS: CPT

## 2022-07-08 PROCEDURE — 87635 SARS-COV-2 COVID-19 AMP PRB: CPT

## 2022-07-08 ASSESSMENT — ENCOUNTER SYMPTOMS
PHOTOPHOBIA: 0
COUGH: 1
RHINORRHEA: 1
BACK PAIN: 0
VOMITING: 0
SORE THROAT: 1
NAUSEA: 0
SHORTNESS OF BREATH: 0
DIARRHEA: 0
ABDOMINAL PAIN: 0

## 2022-07-08 ASSESSMENT — PAIN - FUNCTIONAL ASSESSMENT: PAIN_FUNCTIONAL_ASSESSMENT: NONE - DENIES PAIN

## 2022-07-08 NOTE — ED NOTES
Patient to ED via self to room 11  Here for complaint of flu-like symptoms including: fever, body aches, chills, cough, sinus congestion, and sore throat  States it started this morning  Has been taking his temperature today which was in the 100s; on arrival patient's oral temp is 98.9  Denies being immunized against covid or the flu  Denies CP, SOB, or N/V    Vitals obtained and call light provided  Patient resting comfortably on stretcher in no apparent distress  Respirations even and non-labored  Awaiting provider evaluation at this time     Nestor Rodriguez RN  07/08/22 0893

## 2022-07-08 NOTE — ED NOTES
Writer to bedside to discharge patient  Writer asks if patient has any questions and patient states \"Yeah, I want to know why the doctor from the first shift is so ignorant and thinks that I have covid when I clearly have the flu\"  Writer did not respond  Patient continues:  \"This is ridiculous that I had to wait this long just for a chest x-ray to come back\"  Patient educated on patient care in Emergency Rooms and all questions were answered  No other needs  Patient ambulatory out of department     Saadia Ochoa RN  07/08/22 5272

## 2022-07-08 NOTE — ED PROVIDER NOTES
TABLET    Take 800 mg by mouth every 6 hours as needed for Pain       ALLERGIES     is allergic to shellfish-derived products. FAMILY HISTORY     has no family status information on file. family history is not on file. SOCIAL HISTORY      reports that he has never smoked. He has never used smokeless tobacco. He reports current alcohol use. He reports current drug use. PHYSICAL EXAM    (up to 7 for level 4, 8 or more for level 5)   INITIAL VITALS:  height is 5' 11\" (1.803 m) and weight is 106.6 kg (235 lb). His oral temperature is 98.9 °F (37.2 °C). His blood pressure is 144/93 (abnormal) and his pulse is 86. His respiration is 16 and oxygen saturation is 96%. Physical Exam  Vitals and nursing note reviewed. Constitutional:       Appearance: He is well-developed. HENT:      Head: Normocephalic and atraumatic. Right Ear: Tympanic membrane and ear canal normal. No drainage, swelling or tenderness. No middle ear effusion. Tympanic membrane is not erythematous. Left Ear: Tympanic membrane and ear canal normal. No drainage, swelling or tenderness. No middle ear effusion. Tympanic membrane is not erythematous. Nose: No congestion. Mouth/Throat:      Mouth: Mucous membranes are moist.      Pharynx: Posterior oropharyngeal erythema present. No oropharyngeal exudate. Tonsils: No tonsillar exudate or tonsillar abscesses. 0 on the right. 0 on the left. Eyes:      Conjunctiva/sclera: Conjunctivae normal.      Pupils: Pupils are equal, round, and reactive to light. Cardiovascular:      Rate and Rhythm: Normal rate and regular rhythm. Heart sounds: Normal heart sounds. No murmur heard. No friction rub. No gallop. Pulmonary:      Effort: Pulmonary effort is normal.      Breath sounds: Normal breath sounds. No wheezing, rhonchi or rales. Abdominal:      General: Bowel sounds are normal.      Palpations: Abdomen is soft. Tenderness: There is no abdominal tenderness. There is no guarding or rebound. Skin:     General: Skin is warm and dry. Capillary Refill: Capillary refill takes less than 2 seconds. Neurological:      General: No focal deficit present. Mental Status: He is alert and oriented to person, place, and time. Psychiatric:         Mood and Affect: Mood normal.         Behavior: Behavior normal.         DIFFERENTIAL DIAGNOSIS/ MDM:     49-year-old male presents complaining of fever, nasal congestion, myalgias, cough since yesterday. His COVID swab is negative, but I believe the patient probably still does have COVID. Patient and I discussed this, indicating that it may be too soon in the course of his illness to test.  We did proceed with a chest x-ray to rule out pneumonia, and there was no infiltrate noted. I recommended that the patient follow the CDC recommendations for quarantine. DIAGNOSTIC RESULTS     EKG: All EKG's are interpreted by the Emergency Department Physician who either signs or Co-signs this chart in the absence of a cardiologist.    none    RADIOLOGY:        Interpretation per the Radiologist below, if available at the time of this note:    pending    LABS:  Results for orders placed or performed during the hospital encounter of 07/08/22   COVID-19, Rapid    Specimen: Nasopharyngeal Swab   Result Value Ref Range    Specimen Description . NASOPHARYNGEAL SWAB     SARS-CoV-2, Rapid Not Detected Not Detected       Covid negative    EMERGENCY DEPARTMENT COURSE:   Vitals:    Vitals:    07/08/22 1651   BP: (!) 144/93   Pulse: 86   Resp: 16   Temp: 98.9 °F (37.2 °C)   TempSrc: Oral   SpO2: 96%   Weight: 106.6 kg (235 lb)   Height: 5' 11\" (1.803 m)     -------------------------  BP: (!) 144/93, Temp: 98.9 °F (37.2 °C), Heart Rate: 86, Resp: 16      RE-EVALUATION:  Patient updated on test results and plan of care. CONSULTS:  none    PROCEDURES:  None    FINAL IMPRESSION      1.  Viral syndrome          DISPOSITION/PLAN   DISPOSITION Decision To Discharge 07/08/2022 07:02:21 PM      CONDITION ON DISPOSITION:   Stable     PATIENT REFERRED TO:  Yarelis Amanda  [id number] [address]  [phone]    Schedule an appointment as soon as possible for a visit in 3 days        DISCHARGE MEDICATIONS:  New Prescriptions    No medications on file       (Please note that portions of this note were completed with a voicerecognition program.  Efforts were made to edit the dictations but occasionally words are mis-transcribed.)    Brian Moon MD,, MD, F.A.C.E.P.   Attending Emergency Medicine Physician       Brian Moon MD  07/08/22 2029

## 2022-07-08 NOTE — ED NOTES
Pt calls out stating \" I have been here for way too long. What are you guys even doing? Waiting for the new doc to come in, the other one didn't even know what she was doing. \"   Pt edu that his chest xray has not been read by the radiologist and that we have called to see what the delay is. Pt states \"I don't even care about the chest xray. I'm ready to leave. \"  Dr. Margot Raza notified      Xin Kong RN  07/08/22 2122

## 2023-08-23 ENCOUNTER — APPOINTMENT (OUTPATIENT)
Dept: CT IMAGING | Facility: CLINIC | Age: 43
End: 2023-08-23
Payer: MEDICAID

## 2023-08-23 ENCOUNTER — HOSPITAL ENCOUNTER (EMERGENCY)
Facility: CLINIC | Age: 43
Discharge: HOME | End: 2023-08-23
Attending: EMERGENCY MEDICINE
Payer: MEDICAID

## 2023-08-23 VITALS
BODY MASS INDEX: 32.9 KG/M2 | HEIGHT: 71 IN | DIASTOLIC BLOOD PRESSURE: 97 MMHG | OXYGEN SATURATION: 96 % | WEIGHT: 235 LBS | TEMPERATURE: 97.9 F | RESPIRATION RATE: 18 BRPM | SYSTOLIC BLOOD PRESSURE: 129 MMHG | HEART RATE: 92 BPM

## 2023-08-23 DIAGNOSIS — R19.7 DIARRHEA, UNSPECIFIED TYPE: Primary | ICD-10-CM

## 2023-08-23 DIAGNOSIS — K40.90 RIGHT INGUINAL HERNIA: ICD-10-CM

## 2023-08-23 DIAGNOSIS — K80.20 CALCULUS OF GALLBLADDER WITHOUT CHOLECYSTITIS WITHOUT OBSTRUCTION: ICD-10-CM

## 2023-08-23 DIAGNOSIS — K76.0 HEPATIC STEATOSIS: ICD-10-CM

## 2023-08-23 DIAGNOSIS — R16.0 HEPATOMEGALY: ICD-10-CM

## 2023-08-23 DIAGNOSIS — K85.90 ACUTE PANCREATITIS, UNSPECIFIED COMPLICATION STATUS, UNSPECIFIED PANCREATITIS TYPE: ICD-10-CM

## 2023-08-23 DIAGNOSIS — S39.012A STRAIN OF LUMBAR REGION, INITIAL ENCOUNTER: ICD-10-CM

## 2023-08-23 LAB
ALBUMIN SERPL-MCNC: 4.4 G/DL (ref 3.5–5.2)
ALBUMIN/GLOB SERPL: 2 {RATIO} (ref 1–2.5)
ALP SERPL-CCNC: 52 U/L (ref 40–129)
ALT SERPL-CCNC: 42 U/L (ref 5–41)
AMYLASE SERPL-CCNC: 109 U/L (ref 28–100)
ANION GAP SERPL CALCULATED.3IONS-SCNC: 10 MMOL/L (ref 9–17)
AST SERPL-CCNC: 21 U/L
BASOPHILS # BLD: 0 K/UL (ref 0–0.2)
BASOPHILS NFR BLD: 0 % (ref 0–2)
BILIRUB SERPL-MCNC: 0.3 MG/DL (ref 0.3–1.2)
BILIRUB UR QL STRIP: NEGATIVE
BUN SERPL-MCNC: 15 MG/DL (ref 6–20)
CALCIUM SERPL-MCNC: 9.5 MG/DL (ref 8.6–10.4)
CHLORIDE SERPL-SCNC: 106 MMOL/L (ref 98–107)
CLARITY UR: CLEAR
CO2 SERPL-SCNC: 26 MMOL/L (ref 20–31)
COLOR UR: YELLOW
COMMENT: NORMAL
CREAT SERPL-MCNC: 1 MG/DL (ref 0.7–1.2)
EOSINOPHIL # BLD: 0.2 K/UL (ref 0–0.4)
EOSINOPHILS RELATIVE PERCENT: 2 % (ref 1–4)
ERYTHROCYTE [DISTWIDTH] IN BLOOD BY AUTOMATED COUNT: 13.4 % (ref 12.5–15.4)
GFR SERPL CREATININE-BSD FRML MDRD: >60 ML/MIN/1.73M2
GLUCOSE SERPL-MCNC: 136 MG/DL (ref 70–99)
GLUCOSE UR STRIP-MCNC: NEGATIVE MG/DL
HCT VFR BLD AUTO: 45.6 % (ref 41–53)
HGB BLD-MCNC: 15.5 G/DL (ref 13.5–17.5)
HGB UR QL STRIP.AUTO: NEGATIVE
KETONES UR STRIP-MCNC: NEGATIVE MG/DL
LACTATE BLDV-SCNC: 1.8 MMOL/L (ref 0.5–2.2)
LEUKOCYTE ESTERASE UR QL STRIP: NEGATIVE
LIPASE SERPL-CCNC: 152 U/L (ref 13–60)
LYMPHOCYTES NFR BLD: 2.5 K/UL (ref 1–4.8)
LYMPHOCYTES RELATIVE PERCENT: 32 % (ref 24–44)
MCH RBC QN AUTO: 30.1 PG (ref 26–34)
MCHC RBC AUTO-ENTMCNC: 33.9 G/DL (ref 31–37)
MCV RBC AUTO: 88.7 FL (ref 80–100)
MONOCYTES NFR BLD: 0.5 K/UL (ref 0.1–1.2)
MONOCYTES NFR BLD: 6 % (ref 2–11)
NEUTROPHILS NFR BLD: 60 % (ref 36–66)
NEUTS SEG NFR BLD: 4.5 K/UL (ref 1.8–7.7)
NITRITE UR QL STRIP: NEGATIVE
PH UR STRIP: 5.5 [PH] (ref 5–8)
PLATELET # BLD AUTO: 213 K/UL (ref 140–450)
PMV BLD AUTO: 8.3 FL (ref 6–12)
POTASSIUM SERPL-SCNC: 3.9 MMOL/L (ref 3.7–5.3)
PROT SERPL-MCNC: 6.6 G/DL (ref 6.4–8.3)
PROT UR STRIP-MCNC: NEGATIVE MG/DL
RBC # BLD AUTO: 5.14 M/UL (ref 4.5–5.9)
SODIUM SERPL-SCNC: 142 MMOL/L (ref 135–144)
SP GR UR STRIP: 1.02 (ref 1–1.03)
UROBILINOGEN UR STRIP-ACNC: NORMAL EU/DL (ref 0–1)
WBC OTHER # BLD: 7.6 K/UL (ref 3.5–11)

## 2023-08-23 PROCEDURE — 81003 URINALYSIS AUTO W/O SCOPE: CPT

## 2023-08-23 PROCEDURE — 99284 EMERGENCY DEPT VISIT MOD MDM: CPT

## 2023-08-23 PROCEDURE — 83605 ASSAY OF LACTIC ACID: CPT

## 2023-08-23 PROCEDURE — 2580000003 HC RX 258: Performed by: EMERGENCY MEDICINE

## 2023-08-23 PROCEDURE — 85025 COMPLETE CBC W/AUTO DIFF WBC: CPT

## 2023-08-23 PROCEDURE — 80053 COMPREHEN METABOLIC PANEL: CPT

## 2023-08-23 PROCEDURE — 96374 THER/PROPH/DIAG INJ IV PUSH: CPT

## 2023-08-23 PROCEDURE — 83690 ASSAY OF LIPASE: CPT

## 2023-08-23 PROCEDURE — 82150 ASSAY OF AMYLASE: CPT

## 2023-08-23 PROCEDURE — 74176 CT ABD & PELVIS W/O CONTRAST: CPT

## 2023-08-23 PROCEDURE — 36415 COLL VENOUS BLD VENIPUNCTURE: CPT

## 2023-08-23 PROCEDURE — 6360000002 HC RX W HCPCS: Performed by: EMERGENCY MEDICINE

## 2023-08-23 PROCEDURE — 6370000000 HC RX 637 (ALT 250 FOR IP): Performed by: EMERGENCY MEDICINE

## 2023-08-23 RX ORDER — KETOROLAC TROMETHAMINE 15 MG/ML
15 INJECTION, SOLUTION INTRAMUSCULAR; INTRAVENOUS ONCE
Status: COMPLETED | OUTPATIENT
Start: 2023-08-23 | End: 2023-08-23

## 2023-08-23 RX ORDER — DIPHENOXYLATE HYDROCHLORIDE AND ATROPINE SULFATE 2.5; .025 MG/1; MG/1
1 TABLET ORAL ONCE
Status: COMPLETED | OUTPATIENT
Start: 2023-08-23 | End: 2023-08-23

## 2023-08-23 RX ORDER — ONDANSETRON 4 MG/1
4 TABLET, ORALLY DISINTEGRATING ORAL EVERY 8 HOURS PRN
Qty: 10 TABLET | Refills: 0 | Status: SHIPPED | OUTPATIENT
Start: 2023-08-23

## 2023-08-23 RX ORDER — MORPHINE SULFATE 4 MG/ML
4 INJECTION, SOLUTION INTRAMUSCULAR; INTRAVENOUS ONCE
Status: DISCONTINUED | OUTPATIENT
Start: 2023-08-23 | End: 2023-08-23

## 2023-08-23 RX ORDER — ONDANSETRON 4 MG/1
4 TABLET, ORALLY DISINTEGRATING ORAL EVERY 8 HOURS PRN
Qty: 10 TABLET | Refills: 0 | Status: SHIPPED | OUTPATIENT
Start: 2023-08-23 | End: 2023-08-23 | Stop reason: SDUPTHER

## 2023-08-23 RX ORDER — 0.9 % SODIUM CHLORIDE 0.9 %
1000 INTRAVENOUS SOLUTION INTRAVENOUS ONCE
Status: COMPLETED | OUTPATIENT
Start: 2023-08-23 | End: 2023-08-23

## 2023-08-23 RX ADMIN — KETOROLAC TROMETHAMINE 15 MG: 15 INJECTION, SOLUTION INTRAMUSCULAR; INTRAVENOUS at 17:54

## 2023-08-23 RX ADMIN — DIPHENOXYLATE HYDROCHLORIDE AND ATROPINE SULFATE 1 TABLET: 2.5; .025 TABLET ORAL at 17:54

## 2023-08-23 RX ADMIN — SODIUM CHLORIDE 1000 ML: 9 INJECTION, SOLUTION INTRAVENOUS at 17:49

## 2023-08-23 ASSESSMENT — LIFESTYLE VARIABLES
HOW MANY STANDARD DRINKS CONTAINING ALCOHOL DO YOU HAVE ON A TYPICAL DAY: PATIENT DOES NOT DRINK
HOW OFTEN DO YOU HAVE A DRINK CONTAINING ALCOHOL: NEVER

## 2023-08-23 ASSESSMENT — PAIN - FUNCTIONAL ASSESSMENT: PAIN_FUNCTIONAL_ASSESSMENT: 0-10

## 2023-08-23 ASSESSMENT — PAIN SCALES - GENERAL: PAINLEVEL_OUTOF10: 8

## 2023-08-23 NOTE — ED PROVIDER NOTES
[facility] ED  [address]  Phone: [phone]        ADDENDUM:      Care of this patient was assumed from Dr. Andrew Huerta. The patient was seen for Back Pain (Since this am, chronic back pain) and Diarrhea (For the last 2 days, everyone else in the family is well/)  . The patient's initial evaluation and plan have been discussed with the prior provider who initially evaluated the patient. Nursing Notes, Past Medical Hx, Past Surgical Hx, Allergies, were all reviewed. PAST MEDICAL HISTORY    has a past medical history of Hyperlipemia. SURGICAL HISTORY      has a past surgical history that includes fracture surgery. CURRENT MEDICATIONS       Discharge Medication List as of 8/23/2023  8:43 PM        CONTINUE these medications which have NOT CHANGED    Details   ibuprofen (ADVIL;MOTRIN) 800 MG tablet Take 800 mg by mouth every 6 hours as needed for PainHistorical Med      ACYCLOVIR PO Take by mouthHistorical Med             ALLERGIES     is allergic to shellfish-derived products.       Diagnostic Results     LABS:   Results for orders placed or performed during the hospital encounter of 08/23/23   CBC with Auto Differential   Result Value Ref Range    WBC 7.6 3.5 - 11.0 k/uL    RBC 5.14 4.5 - 5.9 m/uL    Hemoglobin 15.5 13.5 - 17.5 g/dL    Hematocrit 45.6 41 - 53 %    MCV 88.7 80 - 100 fL    MCH 30.1 26 - 34 pg    MCHC 33.9 31 - 37 g/dL    RDW 13.4 12.5 - 15.4 %    Platelets 262 738 - 249 k/uL    MPV 8.3 6.0 - 12.0 fL    Neutrophils % 60 36 - 66 %    Lymphocytes % 32 24 - 44 %    Monocytes % 6 2 - 11 %    Eosinophils % 2 1 - 4 %    Basophils % 0 0 - 2 %    Neutrophils Absolute 4.50 1.8 - 7.7 k/uL    Lymphocytes Absolute 2.50 1.0 - 4.8 k/uL    Monocytes Absolute 0.50 0.1 - 1.2 k/uL    Eosinophils Absolute 0.20 0.0 - 0.4 k/uL    Basophils Absolute 0.00 0.0 - 0.2 k/uL   Comprehensive Metabolic Panel   Result Value Ref Range    Glucose 136 (H) 70 - 99 mg/dL    BUN 15 6 - 20 mg/dL

## 2023-08-23 NOTE — ED PROVIDER NOTES
[facility]      Pt Name: Salbador Barton  MRN: [id number]  [address] [place] 1980  Date of evaluation: 8/23/2023      CHIEF COMPLAINT       Chief Complaint   Patient presents with    Back Pain     Since this am, chronic back pain    Diarrhea     For the last 2 days, everyone else in the family is well           HISTORY OF PRESENT ILLNESS      The patient presents with low back pain that started this morning and diarrhea for the past 2 days. The patient states that he has had no ill contacts and everyone else in his family as well. He has had loose stools but no watery stools. He has not had blood in his stool. He reports 6 loose stools per day. He denies fever. He is able to hydrate orally and denies nausea, vomiting, or abdominal pain. The patient also has history of recurrent back pain. He says he just started having pain this morning. It is across the lumbar area. He does not recall any injury. He denies focal weakness or numbness. Other than diarrhea, he is not having any bowel issues such as incontinence and he denies urinary retention. He has not had prior back surgery. He has not had prior abdominal surgery. He denies radicular pain in his lower extremities. He has not had a fever. He denies dysuria. He rates his back pain is an 8 out of 10. He has not tried anything for his pain or diarrhea. He does not wish to take anything normally. REVIEW OF SYSTEMS       All systems reviewed and negative unless noted in HPI. The patient denies fever or constitutional symptoms. Denies vision change. Denies any sore throat or rhinorrhea. Denies any neck pain or stiffness. Denies chest pain or shortness of breath. Diarrhea without nausea, vomiting, or abdominal pain. Denies any dysuria. Denies urinary frequency or hematuria. Denies musculoskeletal injury. Low back pain as noted in HPI.   Denies any weakness, numbness or focal

## 2023-08-28 RX ORDER — VALACYCLOVIR HYDROCHLORIDE 1 G/1
TABLET, FILM COATED ORAL
Qty: 90 TABLET | Refills: 3 | Status: SHIPPED | OUTPATIENT
Start: 2023-08-28

## 2024-01-25 ENCOUNTER — HOSPITAL ENCOUNTER (EMERGENCY)
Facility: CLINIC | Age: 44
Discharge: HOME | End: 2024-01-25
Attending: EMERGENCY MEDICINE
Payer: MEDICAID

## 2024-01-25 VITALS
RESPIRATION RATE: 16 BRPM | SYSTOLIC BLOOD PRESSURE: 146 MMHG | BODY MASS INDEX: 32.78 KG/M2 | TEMPERATURE: 98.2 F | HEIGHT: 71 IN | OXYGEN SATURATION: 98 % | DIASTOLIC BLOOD PRESSURE: 95 MMHG | HEART RATE: 75 BPM

## 2024-01-25 DIAGNOSIS — K11.20 PAROTIDITIS: ICD-10-CM

## 2024-01-25 DIAGNOSIS — I89.1 LYMPHANGITIS: ICD-10-CM

## 2024-01-25 DIAGNOSIS — R51.9 FACIAL PAIN: Primary | ICD-10-CM

## 2024-01-25 PROCEDURE — 99283 EMERGENCY DEPT VISIT LOW MDM: CPT

## 2024-01-25 RX ORDER — CLINDAMYCIN HYDROCHLORIDE 300 MG/1
300 CAPSULE ORAL 3 TIMES DAILY
Qty: 21 CAPSULE | Refills: 0 | Status: SHIPPED | OUTPATIENT
Start: 2024-01-25 | End: 2024-02-01

## 2024-01-25 RX ORDER — CEPHALEXIN 500 MG/1
500 CAPSULE ORAL 4 TIMES DAILY
Qty: 28 CAPSULE | Refills: 0 | Status: SHIPPED | OUTPATIENT
Start: 2024-01-25 | End: 2024-01-25 | Stop reason: CLARIF

## 2024-01-25 RX ORDER — CLINDAMYCIN HYDROCHLORIDE 300 MG/1
300 CAPSULE ORAL 3 TIMES DAILY
Qty: 21 CAPSULE | Refills: 0 | Status: SHIPPED | OUTPATIENT
Start: 2024-01-25 | End: 2024-01-25

## 2024-01-25 NOTE — ED PROVIDER NOTES
EMERGENCY DEPARTMENT ENCOUNTER      Pt Name: Minor Bañuelos  MRN: [id number]  Birthdate 1980  Date of evaluation: 1/25/2024  Provider: Fady Springer PA-C    CHIEF COMPLAINT       Chief Complaint   Patient presents with    Dental Pain     Left lower          HISTORY OF PRESENT ILLNESS      Minor Bañuelos is a 43 y.o. male who presents to the emergency department complaining of left jaw pain pain in his left side of his lower face and TMJ.  States that he felt like it was a dental problem.  He did see the dentist recently who took x-rays of this area which was negative for any abscess formation.  Denies any ear pain, or recent illness, denies any fevers or chills        REVIEW OF SYSTEMS       Review of Systems   AS STATED IN HPI      PAST MEDICAL HISTORY     Past Medical History:   Diagnosis Date    Hyperlipemia          SURGICAL HISTORY       Past Surgical History:   Procedure Laterality Date    FRACTURE SURGERY           CURRENT MEDICATIONS       Previous Medications    ACYCLOVIR PO    Take by mouth    IBUPROFEN (ADVIL;MOTRIN) 800 MG TABLET    Take 800 mg by mouth every 6 hours as needed for Pain    ONDANSETRON (ZOFRAN-ODT) 4 MG DISINTEGRATING TABLET    Take 1 tablet by mouth every 8 hours as needed for Nausea or Vomiting    VALACYCLOVIR (VALTREX) 1 G TABLET    take 1 tablet by mouth once daily       ALLERGIES       Shellfish-derived products    FAMILY HISTORY       No family history on file.       SOCIAL HISTORY       Social History     Tobacco Use    Smoking status: Never    Smokeless tobacco: Never   Substance Use Topics    Alcohol use: Yes     Comment: rarely     Drug use: Yes     Comment: cbc         PHYSICAL EXAM       ED Triage Vitals [01/25/24 1708]   BP Temp Temp src Pulse Respirations SpO2 Height Weight   (!) 146/95 -- -- 75 16 98 % 1.803 m (5' 11\") --       Physical Exam   Nursing note and vitals reviewed.  Constitutional: Oriented to person, place, and time and well-developed, well-nourished.

## 2024-01-25 NOTE — ED PROVIDER NOTES
[facility]    [address] [facility] [address]  Phone: [phone]  Emergency Department  Faculty Attestation    I performed a history and physical examination of the patient and discussed management with the mid level provider. I reviewed the mid level provider's note and agree with the documented findings and plan of care. Any areas of disagreement are noted on the chart. I was personally present for the key portions of any procedures. I have documented in the chart those procedures where I was not present during the key portions. I have reviewed the emergency nurses triage note. I agree with the chief complaint, past medical history, past surgical history, allergies, medications, social and family history as documented unless otherwise noted below. Documentation of the HPI, Physical Exam and Medical Decision Making performed by medical students or scribes is based on my personal performance of the HPI, PE and MDM. For Physician Assistant/ Nurse Practitioner cases/documentation I have personally evaluated this patient and have completed at least one if not all key elements of the E/M (history, physical exam, and MDM). Additional findings are as noted.      Primary Care Physician:  Dariela Brooks    CHIEF COMPLAINT       Chief Complaint   Patient presents with    Dental Pain     Left lower        RECENT VITALS:   Temp: 98.2 °F (36.8 °C),  Pulse: 75, Respirations: 16, BP: (!) 146/95    LABS:  Labs Reviewed - No data to display      PERTINENT ATTENDING PHYSICIAN COMMENTS:    The patient presents with pain in the left side of his face and jaw.  This is worse with eating.  It has been going on for several days.  He says he is not having any actual tooth pain.  He denies ear pain.  He denies fever.  He is not having difficulty breathing, phonating, or swallowing.    On exam, the patient appears to have parotid swelling on the left side only.  There is no submandibular swelling and shotty anterior

## 2024-02-05 RX ORDER — OMEGA-3-ACID ETHYL ESTERS 1 G/1
CAPSULE, LIQUID FILLED ORAL
Qty: 360 CAPSULE | OUTPATIENT
Start: 2024-02-05

## 2025-01-29 PROBLEM — J30.2 SEASONAL ALLERGIES: Status: ACTIVE | Noted: 2017-05-18

## 2025-01-29 PROBLEM — S33.5XXA LUMBAR BACK SPRAIN: Status: ACTIVE | Noted: 2017-05-18

## 2025-01-29 PROBLEM — T14.8XXA COMMINUTED FRACTURE: Status: ACTIVE | Noted: 2017-05-18

## 2025-01-29 PROBLEM — E78.1 HYPERTRIGLYCERIDEMIA: Status: ACTIVE | Noted: 2023-09-08

## 2025-01-29 PROBLEM — I10 ESSENTIAL (PRIMARY) HYPERTENSION: Status: ACTIVE | Noted: 2025-01-29

## 2025-01-29 RX ORDER — OMEGA-3-ACID ETHYL ESTERS 1 G/1
1 CAPSULE, LIQUID FILLED ORAL DAILY
COMMUNITY
Start: 2023-11-10 | End: 2025-01-30 | Stop reason: SDUPTHER

## 2025-01-29 NOTE — PROGRESS NOTES
social history. Updates were made as necessary.    REVIEWED INFORMATION      Allergies   Allergen Reactions    Shellfish-Derived Products        Patient Active Problem List   Diagnosis    Comminuted fracture    Essential (primary) hypertension    Hypertriglyceridemia    Lumbar back sprain    Seasonal allergies       Past Medical History:   Diagnosis Date    Fatty liver     Hyperlipemia        Past Surgical History:   Procedure Laterality Date    FRACTURE SURGERY          Social History     Socioeconomic History    Marital status:      Spouse name: None    Number of children: None    Years of education: None    Highest education level: None   Tobacco Use    Smoking status: Former     Current packs/day: 0.00     Average packs/day: 0.5 packs/day for 18.0 years (9.0 ttl pk-yrs)     Types: Cigarettes     Start date:      Quit date:      Years since quittin.0    Smokeless tobacco: Never   Substance and Sexual Activity    Alcohol use: Yes     Comment: rarely     Drug use: Not Currently     Comment: [facility]     Social Determinants of Health     Food Insecurity: Food Insecurity Present (2025)    Hunger Vital Sign     Worried About Running Out of Food in the Last Year: Sometimes true     Ran Out of Food in the Last Year: Never true   Transportation Needs: No Transportation Needs (2025)    PRAPARE - Transportation     Lack of Transportation (Medical): No     Lack of Transportation (Non-Medical): No    Received from [facility]    [place] Safety [place]   Housing Stability: Low Risk  (2025)    Housing Stability Vital Sign     Unable to Pay for Housing in the Last Year: No     Number of Times Moved in the Last Year: 0     Homeless in the Last Year: No        Objective:      BP (!) 145/91 Comment: Pt will recheck at home  Pulse 79   Temp 98 °F (36.7 °C)   Resp 16   Ht 1.803 m (5' 11\")   Wt 113.4 kg (250 lb)   BMI 34.87 kg/m²    BP Readings from Last 3 Encounters:   25 (!)

## 2025-01-30 ENCOUNTER — OFFICE VISIT (OUTPATIENT)
Age: 45
End: 2025-01-30
Payer: COMMERCIAL

## 2025-01-30 VITALS
DIASTOLIC BLOOD PRESSURE: 91 MMHG | RESPIRATION RATE: 16 BRPM | SYSTOLIC BLOOD PRESSURE: 145 MMHG | HEIGHT: 71 IN | TEMPERATURE: 98 F | HEART RATE: 79 BPM | WEIGHT: 250 LBS | BODY MASS INDEX: 35 KG/M2

## 2025-01-30 DIAGNOSIS — M16.12 PRIMARY OSTEOARTHRITIS OF LEFT HIP: ICD-10-CM

## 2025-01-30 DIAGNOSIS — J30.2 SEASONAL ALLERGIES: ICD-10-CM

## 2025-01-30 DIAGNOSIS — E78.1 HYPERTRIGLYCERIDEMIA: Primary | ICD-10-CM

## 2025-01-30 DIAGNOSIS — A60.00 GENITAL HERPES SIMPLEX, UNSPECIFIED SITE: ICD-10-CM

## 2025-01-30 DIAGNOSIS — E78.1 HYPERTRIGLYCERIDEMIA: ICD-10-CM

## 2025-01-30 DIAGNOSIS — I10 ESSENTIAL (PRIMARY) HYPERTENSION: Primary | ICD-10-CM

## 2025-01-30 PROCEDURE — 99212 OFFICE O/P EST SF 10 MIN: CPT | Performed by: FAMILY MEDICINE

## 2025-01-30 SDOH — ECONOMIC STABILITY: FOOD INSECURITY: WITHIN THE PAST 12 MONTHS, THE FOOD YOU BOUGHT JUST DIDN'T LAST AND YOU DIDN'T HAVE MONEY TO GET MORE.: NEVER TRUE

## 2025-01-30 SDOH — ECONOMIC STABILITY: FOOD INSECURITY: WITHIN THE PAST 12 MONTHS, YOU WORRIED THAT YOUR FOOD WOULD RUN OUT BEFORE YOU GOT MONEY TO BUY MORE.: SOMETIMES TRUE

## 2025-01-30 ASSESSMENT — ENCOUNTER SYMPTOMS
ABDOMINAL PAIN: 0
ROS SKIN COMMENTS: ACNE ROSACEA
CHEST TIGHTNESS: 0
DIARRHEA: 0
CONSTIPATION: 0
BLOOD IN STOOL: 0
COUGH: 0
VOMITING: 0

## 2025-01-30 NOTE — PATIENT INSTRUCTIONS
Good to see you.  We will get labs and I will see you back in a month or 2    Patient Education        DASH Diet: Care Instructions  Your Care Instructions     The DASH diet is an eating plan that can help lower your blood pressure. DASH stands for Dietary Approaches to Stop Hypertension. Hypertension is high blood pressure.  The DASH diet focuses on eating foods that are high in calcium, potassium, and magnesium. These nutrients can lower blood pressure. The foods that are highest in these nutrients are fruits, vegetables, low-fat dairy products, nuts, seeds, and legumes. But taking calcium, potassium, and magnesium supplements instead of eating foods that are high in those nutrients does not have the same effect. The DASH diet also includes whole grains, fish, and poultry.  The DASH diet is one of several lifestyle changes your doctor may recommend to lower your high blood pressure. Your doctor may also want you to decrease the amount of sodium in your diet. Lowering sodium while following the DASH diet can lower blood pressure even further than just the DASH diet alone.  Follow-up care is a key part of your treatment and safety. Be sure to make and go to all appointments, and call your doctor if you are having problems. It's also a good idea to know your test results and keep a list of the medicines you take.  How can you care for yourself at home?  Following the DASH diet  Eat 4 to 5 servings of fruit each day. A serving is 1 medium-sized piece of fruit, ½ cup chopped or canned fruit, 1/4 cup dried fruit, or 4 ounces (½ cup) of fruit juice. Choose fruit more often than fruit juice.  Eat 4 to 5 servings of vegetables each day. A serving is 1 cup of lettuce or raw leafy vegetables, ½ cup of chopped or cooked vegetables, or 4 ounces (½ cup) of vegetable juice. Choose vegetables more often than vegetable juice.  Get 2 to 3 servings of low-fat and fat-free dairy each day. A serving is 8 ounces of milk, 1 cup of

## 2025-01-31 RX ORDER — OMEGA-3-ACID ETHYL ESTERS 1 G/1
1 CAPSULE, LIQUID FILLED ORAL DAILY
Qty: 60 CAPSULE | Refills: 3 | Status: SHIPPED | OUTPATIENT
Start: 2025-01-31

## 2025-01-31 RX ORDER — VALACYCLOVIR HYDROCHLORIDE 1 G/1
1000 TABLET, FILM COATED ORAL DAILY
Qty: 90 TABLET | Refills: 3 | Status: SHIPPED | OUTPATIENT
Start: 2025-01-31

## 2025-01-31 RX ORDER — ONDANSETRON 4 MG/1
4 TABLET, ORALLY DISINTEGRATING ORAL EVERY 8 HOURS PRN
Qty: 10 TABLET | Refills: 0 | Status: SHIPPED | OUTPATIENT
Start: 2025-01-31

## 2025-03-17 ENCOUNTER — RESULTS FOLLOW-UP (OUTPATIENT)
Age: 45
End: 2025-03-17

## 2025-03-17 ENCOUNTER — OFFICE VISIT (OUTPATIENT)
Age: 45
End: 2025-03-17
Payer: COMMERCIAL

## 2025-03-17 ENCOUNTER — HOSPITAL ENCOUNTER (OUTPATIENT)
Facility: CLINIC | Age: 45
Discharge: HOME | End: 2025-03-17
Payer: COMMERCIAL

## 2025-03-17 VITALS
SYSTOLIC BLOOD PRESSURE: 140 MMHG | HEIGHT: 71 IN | BODY MASS INDEX: 34.83 KG/M2 | RESPIRATION RATE: 16 BRPM | WEIGHT: 248.8 LBS | TEMPERATURE: 97.4 F | HEART RATE: 70 BPM | DIASTOLIC BLOOD PRESSURE: 85 MMHG

## 2025-03-17 DIAGNOSIS — I10 ESSENTIAL (PRIMARY) HYPERTENSION: ICD-10-CM

## 2025-03-17 DIAGNOSIS — R36.9 PENILE DISCHARGE: Primary | ICD-10-CM

## 2025-03-17 LAB
ALBUMIN SERPL-MCNC: 4.2 G/DL (ref 3.5–5.2)
ALBUMIN/GLOB SERPL: 1.6 {RATIO} (ref 1–2.5)
ALP SERPL-CCNC: 56 U/L (ref 40–129)
ALT SERPL-CCNC: 49 U/L (ref 10–50)
ANION GAP SERPL CALCULATED.3IONS-SCNC: 12 MMOL/L (ref 9–16)
AST SERPL-CCNC: 29 U/L (ref 10–50)
BASOPHILS # BLD: 0.07 K/UL (ref 0–0.2)
BASOPHILS NFR BLD: 1 % (ref 0–2)
BILIRUB DIRECT SERPL-MCNC: 0.1 MG/DL (ref 0–0.2)
BILIRUB INDIRECT SERPL-MCNC: 0.3 MG/DL (ref 0–1)
BILIRUB SERPL-MCNC: 0.4 MG/DL (ref 0–1.2)
BILIRUBIN, POC: NEGATIVE
BLOOD URINE, POC: NEGATIVE
BUN SERPL-MCNC: 13 MG/DL (ref 6–20)
CALCIUM SERPL-MCNC: 9.7 MG/DL (ref 8.6–10.4)
CHLORIDE SERPL-SCNC: 107 MMOL/L (ref 98–107)
CHOLEST SERPL-MCNC: 226 MG/DL (ref 0–199)
CHOLESTEROL/HDL RATIO: 7.1
CLARITY, POC: NORMAL
CO2 SERPL-SCNC: 24 MMOL/L (ref 20–31)
COLOR, POC: NORMAL
CREAT SERPL-MCNC: 1 MG/DL (ref 0.7–1.2)
CREAT UR-MCNC: 257 MG/DL (ref 39–259)
EOSINOPHIL # BLD: 0.19 K/UL (ref 0–0.44)
EOSINOPHILS RELATIVE PERCENT: 3 % (ref 1–4)
ERYTHROCYTE [DISTWIDTH] IN BLOOD BY AUTOMATED COUNT: 13.2 % (ref 11.8–14.4)
GFR, ESTIMATED: >90 ML/MIN/1.73M2
GLUCOSE SERPL-MCNC: 107 MG/DL (ref 74–99)
GLUCOSE URINE, POC: NEGATIVE MG/DL
HCT VFR BLD AUTO: 46.2 % (ref 40.7–50.3)
HDLC SERPL-MCNC: 32 MG/DL
HGB BLD-MCNC: 15.3 G/DL (ref 13–17)
IMM GRANULOCYTES # BLD AUTO: 0.04 K/UL (ref 0–0.3)
IMM GRANULOCYTES NFR BLD: 1 %
KETONES, POC: NEGATIVE MG/DL
LDLC SERPL CALC-MCNC: ABNORMAL MG/DL (ref 0–100)
LDLC SERPL DIRECT ASSAY-MCNC: 72 MG/DL
LEUKOCYTE EST, POC: NEGATIVE
LYMPHOCYTES NFR BLD: 2.45 K/UL (ref 1.1–3.7)
LYMPHOCYTES RELATIVE PERCENT: 33 % (ref 24–43)
MCH RBC QN AUTO: 29.1 PG (ref 25.2–33.5)
MCHC RBC AUTO-ENTMCNC: 33.1 G/DL (ref 28.4–34.8)
MCV RBC AUTO: 87.8 FL (ref 82.6–102.9)
MICROALBUMIN UR-MCNC: <12 MG/L (ref 0–20)
MICROALBUMIN/CREAT UR-RTO: NORMAL MCG/MG CREAT (ref 0–17)
MONOCYTES NFR BLD: 0.6 K/UL (ref 0.1–1.2)
MONOCYTES NFR BLD: 8 % (ref 3–12)
NEUTROPHILS NFR BLD: 54 % (ref 36–65)
NEUTS SEG NFR BLD: 4.04 K/UL (ref 1.5–8.1)
NITRITE, POC: NEGATIVE
NRBC BLD-RTO: 0 PER 100 WBC
PH, POC: 6
PLATELET # BLD AUTO: 210 K/UL (ref 138–453)
PMV BLD AUTO: 10.7 FL (ref 8.1–13.5)
POTASSIUM SERPL-SCNC: 4 MMOL/L (ref 3.7–5.3)
PROT SERPL-MCNC: 6.8 G/DL (ref 6.6–8.7)
PROTEIN, POC: NEGATIVE MG/DL
RBC # BLD AUTO: 5.26 M/UL (ref 4.21–5.77)
SODIUM SERPL-SCNC: 143 MMOL/L (ref 136–145)
SPECIFIC GRAVITY, POC: 1.03
TRIGL SERPL-MCNC: 661 MG/DL
UROBILINOGEN, POC: 0.2 MG/DL
VLDLC SERPL CALC-MCNC: ABNORMAL MG/DL (ref 1–30)
WBC OTHER # BLD: 7.4 K/UL (ref 3.5–11.3)

## 2025-03-17 PROCEDURE — 82043 UR ALBUMIN QUANTITATIVE: CPT

## 2025-03-17 PROCEDURE — 1036F TOBACCO NON-USER: CPT

## 2025-03-17 PROCEDURE — G8417 CALC BMI ABV UP PARAM F/U: HCPCS

## 2025-03-17 PROCEDURE — 3077F SYST BP >= 140 MM HG: CPT

## 2025-03-17 PROCEDURE — 82570 ASSAY OF URINE CREATININE: CPT

## 2025-03-17 PROCEDURE — 99213 OFFICE O/P EST LOW 20 MIN: CPT

## 2025-03-17 PROCEDURE — 83721 ASSAY OF BLOOD LIPOPROTEIN: CPT

## 2025-03-17 PROCEDURE — G8427 DOCREV CUR MEDS BY ELIG CLIN: HCPCS

## 2025-03-17 PROCEDURE — 81002 URINALYSIS NONAUTO W/O SCOPE: CPT

## 2025-03-17 PROCEDURE — 80053 COMPREHEN METABOLIC PANEL: CPT

## 2025-03-17 PROCEDURE — 36415 COLL VENOUS BLD VENIPUNCTURE: CPT

## 2025-03-17 PROCEDURE — 3079F DIAST BP 80-89 MM HG: CPT

## 2025-03-17 PROCEDURE — 80061 LIPID PANEL: CPT

## 2025-03-17 PROCEDURE — 85025 COMPLETE CBC W/AUTO DIFF WBC: CPT

## 2025-03-17 PROCEDURE — 82248 BILIRUBIN DIRECT: CPT

## 2025-03-17 ASSESSMENT — PATIENT HEALTH QUESTIONNAIRE - PHQ9
SUM OF ALL RESPONSES TO PHQ QUESTIONS 1-9: 0
SUM OF ALL RESPONSES TO PHQ QUESTIONS 1-9: 0
1. LITTLE INTEREST OR PLEASURE IN DOING THINGS: NOT AT ALL
SUM OF ALL RESPONSES TO PHQ QUESTIONS 1-9: 0
2. FEELING DOWN, DEPRESSED OR HOPELESS: NOT AT ALL
SUM OF ALL RESPONSES TO PHQ QUESTIONS 1-9: 0

## 2025-03-17 NOTE — PATIENT INSTRUCTIONS
It was nice to meet you and thank you for coming in today!      Here is our plan:    Penile Discharge  As we discussed the penile discharge that you are having is likely due to compression of the prostate.  Please call the office immediately if you develop any fevers, worsening of your symptoms, pain with urination, flank pain, blood in your urine or any other concerning symptoms  As I discussed with you it would be beneficial to screen for some sexually transmitted diseases so please have this discussion with your wife and call the office with your decision            Have a good rest of your day! Don't hesitate to message on [employer]ant or Call the office if you have any questions.     Laboratory services   -available Mon-Friday 7:30AM-4PM Daily with a Lunch break 12:30-1PM Daily.

## 2025-03-17 NOTE — PROGRESS NOTES
[facility]  [address] [place] [address]  Phone: [phone]  Fax: [phone]      Date of Visit:  3/17/2025  Patient Name: Minor Bañuelos   Patient :  1980         HPI:     Minor Bañuelos is a 44 y.o. male with history of:    Past Medical History:   Diagnosis Date    ADHD (attention deficit hyperactivity disorder)     Started as a child    Asthma     As a child    Chronic back pain As a child    Depression     Been dealing with it, my whole life.    Fatty liver     Headache     At least once, every 3 to 4 days.    Hyperlipemia     Obesity     Currently        Past Surgical History:   Procedure Laterality Date    FRACTURE SURGERY         Patient Active Problem List   Diagnosis    Comminuted fracture    Essential (primary) hypertension    Hypertriglyceridemia    Lumbar back sprain    Seasonal allergies    Former smoker    Class 1 obesity due to excess calories with serious comorbidity and body mass index (BMI) of 34.0 to 34.9 in adult        Presenting to office today to discuss:       Patients Comments during rooming:  Chief Complaint   Patient presents with    possible UTI     Patient states that he slight burning sensation started the other day. [facility]            Interval History                HPI Problem Based         1) Penile Discharge   -states he has had some white penile discharge during a bowel movements  the past week   -he raises his legs up to help the bowel movents pass. The only time he has the white penile discharge is when he raises his legs during the bm. When he doesn't raise his legs he does not have any penile discharge  -Denies any fevers,chills, pain with urination, blood in his urine or discharge  -denies flank pain, scrotal swelling or pain,   -Is  and recently states he had anal sex with his wife but denies any concerns for Sexually transmitted disease.   -States he has had gonnorhea in the past

## 2025-04-08 PROBLEM — E66.811 CLASS 1 OBESITY DUE TO EXCESS CALORIES WITH SERIOUS COMORBIDITY AND BODY MASS INDEX (BMI) OF 34.0 TO 34.9 IN ADULT: Status: ACTIVE | Noted: 2025-04-08

## 2025-04-08 PROBLEM — Z87.891 FORMER SMOKER: Chronic | Status: ACTIVE | Noted: 2025-04-08

## 2025-04-08 PROBLEM — E66.09 CLASS 1 OBESITY DUE TO EXCESS CALORIES WITH SERIOUS COMORBIDITY AND BODY MASS INDEX (BMI) OF 34.0 TO 34.9 IN ADULT: Status: ACTIVE | Noted: 2025-04-08

## 2025-04-08 ASSESSMENT — ENCOUNTER SYMPTOMS
ABDOMINAL PAIN: 0
CONSTIPATION: 0
DIARRHEA: 0
ROS SKIN COMMENTS: ACNE ROSACEA
COUGH: 0
VOMITING: 0
BLOOD IN STOOL: 0
CHEST TIGHTNESS: 0

## 2025-04-08 NOTE — PROGRESS NOTES
Skin:     General: Skin is warm and dry.      Comments: Pustular lesions consistent with acne rosacea   Neurological:      General: No focal deficit present.   Psychiatric:         Mood and Affect: Mood normal.         Behavior: Behavior normal.         Thought Content: Thought content normal.                 Please note that parts of this chart were generated using voice recognition Dragon dictation software.  Although every effort was made to ensure the accuracy of this automated transcription, some errors in transcription may have occurred.    Electronically signed by Santosh Vides MD on 4/10/2025 at 9:29 AM

## 2025-04-10 ENCOUNTER — OFFICE VISIT (OUTPATIENT)
Age: 45
End: 2025-04-10
Payer: COMMERCIAL

## 2025-04-10 VITALS
DIASTOLIC BLOOD PRESSURE: 78 MMHG | TEMPERATURE: 97.8 F | BODY MASS INDEX: 34.86 KG/M2 | RESPIRATION RATE: 16 BRPM | SYSTOLIC BLOOD PRESSURE: 139 MMHG | HEIGHT: 71 IN | HEART RATE: 81 BPM | WEIGHT: 249 LBS

## 2025-04-10 DIAGNOSIS — E66.09 CLASS 1 OBESITY DUE TO EXCESS CALORIES WITH SERIOUS COMORBIDITY AND BODY MASS INDEX (BMI) OF 34.0 TO 34.9 IN ADULT: ICD-10-CM

## 2025-04-10 DIAGNOSIS — E78.1 HYPERTRIGLYCERIDEMIA: ICD-10-CM

## 2025-04-10 DIAGNOSIS — I10 ESSENTIAL (PRIMARY) HYPERTENSION: Primary | ICD-10-CM

## 2025-04-10 DIAGNOSIS — A60.01 HERPES SIMPLEX INFECTION OF PENIS: ICD-10-CM

## 2025-04-10 DIAGNOSIS — E66.811 CLASS 1 OBESITY DUE TO EXCESS CALORIES WITH SERIOUS COMORBIDITY AND BODY MASS INDEX (BMI) OF 34.0 TO 34.9 IN ADULT: ICD-10-CM

## 2025-04-10 DIAGNOSIS — Z87.891 FORMER SMOKER: Chronic | ICD-10-CM

## 2025-04-10 PROCEDURE — 3075F SYST BP GE 130 - 139MM HG: CPT | Performed by: FAMILY MEDICINE

## 2025-04-10 PROCEDURE — 1036F TOBACCO NON-USER: CPT | Performed by: FAMILY MEDICINE

## 2025-04-10 PROCEDURE — 99212 OFFICE O/P EST SF 10 MIN: CPT | Performed by: FAMILY MEDICINE

## 2025-04-10 PROCEDURE — 99214 OFFICE O/P EST MOD 30 MIN: CPT | Performed by: FAMILY MEDICINE

## 2025-04-10 PROCEDURE — G8417 CALC BMI ABV UP PARAM F/U: HCPCS | Performed by: FAMILY MEDICINE

## 2025-04-10 PROCEDURE — G8427 DOCREV CUR MEDS BY ELIG CLIN: HCPCS | Performed by: FAMILY MEDICINE

## 2025-04-10 PROCEDURE — 3078F DIAST BP <80 MM HG: CPT | Performed by: FAMILY MEDICINE

## 2025-04-10 RX ORDER — VALACYCLOVIR HYDROCHLORIDE 500 MG/1
TABLET, FILM COATED ORAL
Qty: 10 TABLET | Refills: 3 | Status: SHIPPED | OUTPATIENT
Start: 2025-04-10

## 2025-04-10 NOTE — PATIENT INSTRUCTIONS
Great to see you today!    Keep up the good work with increasing your exercise and watching your diet.    Be sure to keep a food diary as well as a diary for your flares for the acne rosacea.  Keep it simple!  You just want to see if there is a pattern.    I will see you back in 3 months.

## 2025-05-12 DIAGNOSIS — A60.00 GENITAL HERPES SIMPLEX, UNSPECIFIED SITE: ICD-10-CM

## 2025-05-12 RX ORDER — VALACYCLOVIR HYDROCHLORIDE 1 G/1
1000 TABLET, FILM COATED ORAL DAILY
Qty: 90 TABLET | Refills: 3 | Status: SHIPPED | OUTPATIENT
Start: 2025-05-12

## 2025-06-05 ENCOUNTER — OFFICE VISIT (OUTPATIENT)
Age: 45
End: 2025-06-05
Payer: COMMERCIAL

## 2025-06-05 VITALS
DIASTOLIC BLOOD PRESSURE: 89 MMHG | RESPIRATION RATE: 16 BRPM | SYSTOLIC BLOOD PRESSURE: 139 MMHG | WEIGHT: 246 LBS | BODY MASS INDEX: 34.44 KG/M2 | HEIGHT: 71 IN | TEMPERATURE: 98.3 F | HEART RATE: 71 BPM

## 2025-06-05 DIAGNOSIS — M53.3 SACRAL BACK PAIN: ICD-10-CM

## 2025-06-05 DIAGNOSIS — E66.09 CLASS 1 OBESITY DUE TO EXCESS CALORIES WITH SERIOUS COMORBIDITY AND BODY MASS INDEX (BMI) OF 34.0 TO 34.9 IN ADULT: Primary | ICD-10-CM

## 2025-06-05 DIAGNOSIS — R14.0 ABDOMINAL BLOATING: ICD-10-CM

## 2025-06-05 DIAGNOSIS — E66.811 CLASS 1 OBESITY DUE TO EXCESS CALORIES WITH SERIOUS COMORBIDITY AND BODY MASS INDEX (BMI) OF 34.0 TO 34.9 IN ADULT: Primary | ICD-10-CM

## 2025-06-05 PROCEDURE — G8417 CALC BMI ABV UP PARAM F/U: HCPCS | Performed by: FAMILY MEDICINE

## 2025-06-05 PROCEDURE — 99214 OFFICE O/P EST MOD 30 MIN: CPT | Performed by: FAMILY MEDICINE

## 2025-06-05 PROCEDURE — 1036F TOBACCO NON-USER: CPT | Performed by: FAMILY MEDICINE

## 2025-06-05 PROCEDURE — G8427 DOCREV CUR MEDS BY ELIG CLIN: HCPCS | Performed by: FAMILY MEDICINE

## 2025-06-05 PROCEDURE — 3075F SYST BP GE 130 - 139MM HG: CPT | Performed by: FAMILY MEDICINE

## 2025-06-05 PROCEDURE — 3079F DIAST BP 80-89 MM HG: CPT | Performed by: FAMILY MEDICINE

## 2025-06-05 NOTE — PATIENT INSTRUCTIONS
Great to see you today.    I would like you to try some simethicone over-the-counter also known as Gas-X but the store brand is fine.  Use it as directed.  I am putting in a referral for the gastroenterologist and if the simethicone is not helpful we will proceed with the appointment.    Will also set you up with OMT for evaluation for the low back pain.    I should see you back this summer for your hypertension.

## 2025-06-05 NOTE — PROGRESS NOTES
a glass of water, resulting in a painful bulge under his rib cage that lasts throughout the day. The bulge subsides during sleep. He experiences nausea without vomiting but does not report any heartburn. His bowel movements remain unchanged, occasionally hard or easy. He has made dietary modifications, including a low-sodium diet, reduced fat intake, and avoidance of junk food and candy. He has attempted to manage his symptoms with Tums and Rolaids and is considering Beano. He has not tried simethicone. He uses Zofran sparingly, approximately once every few weeks. He is also on Valtrex, Motrin as needed, liver extract, and Lovaza.    He reports back pain, localized above his tailbone to the left, which he experiences daily. He recalls an incident during intimacy where he felt pressure in his lower spine, as if it were , causing him fear of movement. He has not sought treatment for this pain but continues to stretch and increase his activity level. He finds temporary relief from side bends.       Review Of Systems  Review of Systems      I personally reviewed the patient's past medical history, current medications, allergies, surgical history, family history and social history. Updates were made as necessary.    REVIEWED INFORMATION      Allergies   Allergen Reactions    Shellfish-Derived Products        Patient Active Problem List   Diagnosis    Comminuted fracture    Essential (primary) hypertension    Hypertriglyceridemia    Lumbar back sprain    Seasonal allergies    Former smoker    Class 1 obesity due to excess calories with serious comorbidity and body mass index (BMI) of 34.0 to 34.9 in adult       Past Medical History:   Diagnosis Date    ADHD (attention deficit hyperactivity disorder)     Started as a child    Asthma     As a child    Chronic back pain As a child    Depression     Been dealing with it, my whole life.    Fatty liver     Headache     At least once, every 3 to 4 days.    Hyperlipemia

## 2025-06-26 NOTE — PROGRESS NOTES
[facility]  [address] [place] [address]  Phone: [phone]  Fax: [phone]  Date of Visit:  2025  Patient Name: Minor Bañuelos   Patient :  1980     CHIEF COMPLAINT:     Minor Bañuelos is a 45 y.o. male who presents today for an OMT visit to be evaluated for the following condition(s):  Chief Complaint   Patient presents with    Procedure     Back and left neck in nec/leading to shoulder     HISTORY OF PRESENT ILLNESS     Patient presents for OMT evaluation of the left neck and shoulder region. He feels he has a pinched nerve in this area, and he also sometimes has pain in his right flank. The neck pain started this morning when he woke up. He works in a warehouse as a  and has lots of repetitive left shoulder motions. He has been doing simple stretches with some relief. He has been using Advil and an icy-hot cream that does help with some of the mild pain. The pain radiates from the neck to the left deltoid region. He also had had tingling in the left arm usually to the elbow but occasionally to the left hand.    Patient denies numbness in the extremities, F/C, unintentional weight loss, and saddle anesthesia.    Patient desires to proceed with OMT.    I personally reviewed the patient's past medical history, current medications, allergies, surgical history, family history and social history.  Updates were made as necessary.    REVIEW OF SYSTEM      General: Denies feeling poorly, tired, fever, chills  Cardiovascular: Denies chest pain, lower extremity edema, palpitations  Respiratory: Denies cough, shortness of breath at rest  MSK: Pain and tension per HPI  Neuro: Denies dizziness, headache    REVIEWED INFORMATION      Current Outpatient Medications   Medication Sig Dispense Refill    ASHWAGANDHA PO Take by mouth      valACYclovir (VALTREX) 1 g tablet Take 1 tablet by mouth daily 90 tablet 3    valACYclovir

## 2025-06-26 NOTE — PATIENT INSTRUCTIONS
Thank you for coming in today, it was a pleasure to see you!      I did attach some exercises for you to do - hold off on anything extreme for today and tomorrow and then start to introduce stretches and exercises slowly.  If you are not getting any relief within the next 2-3 weeks from this appointment then please call the office to schedule follow-up sooner so we can further investigate the pain.  As discussed, you may notice increased soreness or pain in the treated areas today after OMT as metabolites released from tense muscles work their way out of your body.    You may take Tylenol or ibuprofen per package directions for pain relief as needed if you are normally able to tolerate those medications. The pain should improve after 24-48 hours.  Be sure to drink plenty of water.     Please continue to stretch and follow any exercises given 1-2 times daily.    You may return for repeat OMT in 2-4 weeks if needed or anytime thereafter.    Thank you!  Please call with any questions or concerns.

## 2025-06-27 ENCOUNTER — PROCEDURE VISIT (OUTPATIENT)
Age: 45
End: 2025-06-27

## 2025-06-27 VITALS
DIASTOLIC BLOOD PRESSURE: 84 MMHG | RESPIRATION RATE: 14 BRPM | HEIGHT: 71 IN | BODY MASS INDEX: 34.44 KG/M2 | TEMPERATURE: 98.3 F | WEIGHT: 246 LBS | HEART RATE: 76 BPM | SYSTOLIC BLOOD PRESSURE: 129 MMHG

## 2025-06-27 DIAGNOSIS — M99.07 SOMATIC DYSFUNCTION OF UPPER EXTREMITY: ICD-10-CM

## 2025-06-27 DIAGNOSIS — M99.02 SOMATIC DYSFUNCTION OF THORACIC REGION: ICD-10-CM

## 2025-06-27 DIAGNOSIS — M54.2 NECK PAIN ON LEFT SIDE: Primary | ICD-10-CM

## 2025-06-27 DIAGNOSIS — M99.01 SOMATIC DYSFUNCTION OF CERVICAL REGION: ICD-10-CM

## 2025-06-27 DIAGNOSIS — M79.2 RADICULAR PAIN IN LEFT ARM: ICD-10-CM

## 2025-07-24 NOTE — PROGRESS NOTES
[facility]  [address] [place] [address]  Phone: [phone]  Fax: [phone]  Date of Visit:  2025  Patient Name: Minor Bañuelos   Patient :  1980     CHIEF COMPLAINT:     Minor Bañuelos is a 45 y.o. male who presents today for an OMT visit to be evaluated for the following condition(s):  Chief Complaint   Patient presents with    OMT      Lower back      HISTORY OF PRESENT ILLNESS     Patient presents for OMT evaluation of low back. After the last appointment, his neck has improved slightly. His back is more stiff today than pain, which is at about a 3/10. The pain is on the left lower back, and the pain averages 6-7/10. He has done PT for his hip, and he has seen an orthopedic surgeon but cannot afford the surgery.       Patient denies radiation of pain, numbness or tingling in the extremities, F/C, unintentional weight loss, and saddle anesthesia.    Patient desires to proceed with OMT.    I personally reviewed the patient's past medical history, current medications, allergies, surgical history, family history and social history.  Updates were made as necessary.    REVIEW OF SYSTEM      General: Denies feeling poorly, tired, fever, chills  Cardiovascular: Denies chest pain, lower extremity edema, palpitations  Respiratory: Denies cough, shortness of breath at rest  MSK: Pain and tension per HPI  Neuro: Denies dizziness, headache    REVIEWED INFORMATION      Current Outpatient Medications   Medication Sig Dispense Refill    ASHWAGANDHA PO Take by mouth      valACYclovir (VALTREX) 1 g tablet Take 1 tablet by mouth daily 90 tablet 3    valACYclovir (VALTREX) 500 MG tablet Use for recurrence of genital herpes 10 tablet 3    ondansetron (ZOFRAN-ODT) 4 MG disintegrating tablet Take 1 tablet by mouth every 8 hours as needed for Nausea or Vomiting 10 tablet 0    omega-3 acid ethyl esters (LOVAZA) 1 g capsule Take 1 capsule by mouth

## 2025-07-25 ENCOUNTER — PROCEDURE VISIT (OUTPATIENT)
Age: 45
End: 2025-07-25
Payer: COMMERCIAL

## 2025-07-25 VITALS
TEMPERATURE: 98.1 F | DIASTOLIC BLOOD PRESSURE: 94 MMHG | BODY MASS INDEX: 35.01 KG/M2 | SYSTOLIC BLOOD PRESSURE: 146 MMHG | WEIGHT: 251 LBS | HEART RATE: 74 BPM

## 2025-07-25 DIAGNOSIS — M99.02 SOMATIC DYSFUNCTION OF THORACIC REGION: ICD-10-CM

## 2025-07-25 DIAGNOSIS — M16.12 PRIMARY OSTEOARTHRITIS OF LEFT HIP: Primary | ICD-10-CM

## 2025-07-25 DIAGNOSIS — M99.05 SOMATIC DYSFUNCTION OF PELVIS REGION: ICD-10-CM

## 2025-07-25 DIAGNOSIS — G89.29 CHRONIC LEFT-SIDED LOW BACK PAIN WITH LEFT-SIDED SCIATICA: ICD-10-CM

## 2025-07-25 DIAGNOSIS — M99.03 SOMATIC DYSFUNCTION OF LUMBAR REGION: ICD-10-CM

## 2025-07-25 DIAGNOSIS — M54.42 CHRONIC LEFT-SIDED LOW BACK PAIN WITH LEFT-SIDED SCIATICA: ICD-10-CM

## 2025-07-25 DIAGNOSIS — M99.04 SOMATIC DYSFUNCTION OF SACRAL REGION: ICD-10-CM

## 2025-07-25 PROCEDURE — 98926 OSTEOPATH MANJ 3-4 REGIONS: CPT | Performed by: FAMILY MEDICINE

## 2025-07-25 NOTE — PATIENT INSTRUCTIONS
Thank you for coming in today, it was a pleasure to see you!      As discussed, you may notice increased soreness or pain in the treated areas today after OMT as metabolites released from tense muscles work their way out of your body.    You may take Tylenol or ibuprofen per package directions for pain relief as needed if you are normally able to tolerate those medications. The pain should improve after 24-48 hours.  Be sure to drink plenty of water.     Please continue to stretch and follow any exercises given 1-2 times daily.    You may return for repeat OMT in 2-4 weeks if needed or anytime thereafter.    Thank you!  Please call with any questions or concerns.

## 2025-09-05 DIAGNOSIS — E78.1 HYPERTRIGLYCERIDEMIA: ICD-10-CM

## 2025-09-05 RX ORDER — OMEGA-3-ACID ETHYL ESTERS 1 G/1
1 CAPSULE, LIQUID FILLED ORAL DAILY
Qty: 60 CAPSULE | Refills: 3 | Status: SHIPPED | OUTPATIENT
Start: 2025-09-05